# Patient Record
Sex: MALE | Race: OTHER | HISPANIC OR LATINO | URBAN - METROPOLITAN AREA
[De-identification: names, ages, dates, MRNs, and addresses within clinical notes are randomized per-mention and may not be internally consistent; named-entity substitution may affect disease eponyms.]

---

## 2016-01-27 RX ORDER — ATORVASTATIN CALCIUM 80 MG/1
1 TABLET, FILM COATED ORAL
Qty: 0 | Refills: 0 | COMMUNITY
Start: 2016-01-27

## 2016-01-27 RX ORDER — METOPROLOL TARTRATE 50 MG
1 TABLET ORAL
Qty: 0 | Refills: 0 | COMMUNITY
Start: 2016-01-27

## 2016-01-27 RX ORDER — LISINOPRIL 2.5 MG/1
1 TABLET ORAL
Qty: 0 | Refills: 0 | COMMUNITY
Start: 2016-01-27

## 2017-01-04 ENCOUNTER — OUTPATIENT (OUTPATIENT)
Dept: OUTPATIENT SERVICES | Facility: HOSPITAL | Age: 57
LOS: 1 days | End: 2017-01-04
Payer: SELF-PAY

## 2017-01-04 ENCOUNTER — APPOINTMENT (OUTPATIENT)
Dept: CARDIOLOGY | Facility: CLINIC | Age: 57
End: 2017-01-04

## 2017-01-04 VITALS
WEIGHT: 200 LBS | RESPIRATION RATE: 16 BRPM | HEART RATE: 67 BPM | DIASTOLIC BLOOD PRESSURE: 76 MMHG | HEIGHT: 71 IN | BODY MASS INDEX: 28 KG/M2 | SYSTOLIC BLOOD PRESSURE: 115 MMHG

## 2017-01-04 VITALS — RESPIRATION RATE: 16 BRPM | SYSTOLIC BLOOD PRESSURE: 111 MMHG | HEART RATE: 70 BPM | DIASTOLIC BLOOD PRESSURE: 72 MMHG

## 2017-01-04 DIAGNOSIS — I25.10 ATHEROSCLEROTIC HEART DISEASE OF NATIVE CORONARY ARTERY WITHOUT ANGINA PECTORIS: ICD-10-CM

## 2017-01-04 PROCEDURE — G0463: CPT

## 2017-03-28 ENCOUNTER — OUTPATIENT (OUTPATIENT)
Dept: OUTPATIENT SERVICES | Facility: HOSPITAL | Age: 57
LOS: 1 days | End: 2017-03-28
Payer: SELF-PAY

## 2017-03-28 DIAGNOSIS — I25.10 ATHEROSCLEROTIC HEART DISEASE OF NATIVE CORONARY ARTERY WITHOUT ANGINA PECTORIS: ICD-10-CM

## 2017-03-28 LAB
ALBUMIN SERPL ELPH-MCNC: 4.3 G/DL — SIGNIFICANT CHANGE UP (ref 3.3–5.2)
ALP SERPL-CCNC: 93 U/L — SIGNIFICANT CHANGE UP (ref 40–120)
ALT FLD-CCNC: 14 U/L — SIGNIFICANT CHANGE UP
ANION GAP SERPL CALC-SCNC: 11 MMOL/L — SIGNIFICANT CHANGE UP (ref 5–17)
AST SERPL-CCNC: 17 U/L — SIGNIFICANT CHANGE UP
BASOPHILS # BLD AUTO: 0 K/UL — SIGNIFICANT CHANGE UP (ref 0–0.2)
BASOPHILS NFR BLD AUTO: 0.3 % — SIGNIFICANT CHANGE UP (ref 0–2)
BILIRUB SERPL-MCNC: 0.6 MG/DL — SIGNIFICANT CHANGE UP (ref 0.4–2)
BUN SERPL-MCNC: 8 MG/DL — SIGNIFICANT CHANGE UP (ref 8–20)
CALCIUM SERPL-MCNC: 9.3 MG/DL — SIGNIFICANT CHANGE UP (ref 8.6–10.2)
CHLORIDE SERPL-SCNC: 103 MMOL/L — SIGNIFICANT CHANGE UP (ref 98–107)
CHOLEST SERPL-MCNC: 129 MG/DL — SIGNIFICANT CHANGE UP (ref 110–199)
CO2 SERPL-SCNC: 28 MMOL/L — SIGNIFICANT CHANGE UP (ref 22–29)
CREAT SERPL-MCNC: 0.87 MG/DL — SIGNIFICANT CHANGE UP (ref 0.5–1.3)
EOSINOPHIL # BLD AUTO: 0.1 K/UL — SIGNIFICANT CHANGE UP (ref 0–0.5)
EOSINOPHIL NFR BLD AUTO: 1 % — SIGNIFICANT CHANGE UP (ref 0–5)
GLUCOSE SERPL-MCNC: 90 MG/DL — SIGNIFICANT CHANGE UP (ref 70–115)
HBA1C BLD-MCNC: 5.1 % — SIGNIFICANT CHANGE UP (ref 4–5.6)
HCT VFR BLD CALC: 41.2 % — LOW (ref 42–52)
HDLC SERPL-MCNC: 57 MG/DL — SIGNIFICANT CHANGE UP
HGB BLD-MCNC: 14.4 G/DL — SIGNIFICANT CHANGE UP (ref 14–18)
LIPID PNL WITH DIRECT LDL SERPL: 55 MG/DL — SIGNIFICANT CHANGE UP
LYMPHOCYTES # BLD AUTO: 2.4 K/UL — SIGNIFICANT CHANGE UP (ref 1–4.8)
LYMPHOCYTES # BLD AUTO: 34.2 % — SIGNIFICANT CHANGE UP (ref 20–55)
MCHC RBC-ENTMCNC: 31 PG — SIGNIFICANT CHANGE UP (ref 27–31)
MCHC RBC-ENTMCNC: 35 G/DL — SIGNIFICANT CHANGE UP (ref 32–36)
MCV RBC AUTO: 88.8 FL — SIGNIFICANT CHANGE UP (ref 80–94)
MONOCYTES # BLD AUTO: 0.5 K/UL — SIGNIFICANT CHANGE UP (ref 0–0.8)
MONOCYTES NFR BLD AUTO: 7.6 % — SIGNIFICANT CHANGE UP (ref 3–10)
NEUTROPHILS # BLD AUTO: 3.9 K/UL — SIGNIFICANT CHANGE UP (ref 1.8–8)
NEUTROPHILS NFR BLD AUTO: 56.8 % — SIGNIFICANT CHANGE UP (ref 37–73)
PLATELET # BLD AUTO: 237 K/UL — SIGNIFICANT CHANGE UP (ref 150–400)
POTASSIUM SERPL-MCNC: 4.4 MMOL/L — SIGNIFICANT CHANGE UP (ref 3.5–5.3)
POTASSIUM SERPL-SCNC: 4.4 MMOL/L — SIGNIFICANT CHANGE UP (ref 3.5–5.3)
PROT SERPL-MCNC: 7.3 G/DL — SIGNIFICANT CHANGE UP (ref 6.6–8.7)
RBC # BLD: 4.64 M/UL — SIGNIFICANT CHANGE UP (ref 4.6–6.2)
RBC # FLD: 13.5 % — SIGNIFICANT CHANGE UP (ref 11–15.6)
SODIUM SERPL-SCNC: 142 MMOL/L — SIGNIFICANT CHANGE UP (ref 135–145)
TOTAL CHOLESTEROL/HDL RATIO MEASUREMENT: 2 RATIO — LOW (ref 3.4–9.6)
TRIGL SERPL-MCNC: 84 MG/DL — SIGNIFICANT CHANGE UP (ref 10–200)
TSH SERPL-MCNC: 1.59 UIU/ML — SIGNIFICANT CHANGE UP (ref 0.27–4.2)
WBC # BLD: 6.9 K/UL — SIGNIFICANT CHANGE UP (ref 4.8–10.8)
WBC # FLD AUTO: 6.9 K/UL — SIGNIFICANT CHANGE UP (ref 4.8–10.8)

## 2017-03-28 PROCEDURE — 85027 COMPLETE CBC AUTOMATED: CPT

## 2017-03-28 PROCEDURE — 36415 COLL VENOUS BLD VENIPUNCTURE: CPT

## 2017-03-28 PROCEDURE — 80053 COMPREHEN METABOLIC PANEL: CPT

## 2017-03-28 PROCEDURE — G0463: CPT

## 2017-03-28 PROCEDURE — 80061 LIPID PANEL: CPT

## 2017-03-28 PROCEDURE — 83036 HEMOGLOBIN GLYCOSYLATED A1C: CPT

## 2017-03-28 PROCEDURE — 84443 ASSAY THYROID STIM HORMONE: CPT

## 2017-04-12 ENCOUNTER — OUTPATIENT (OUTPATIENT)
Dept: OUTPATIENT SERVICES | Facility: HOSPITAL | Age: 57
LOS: 1 days | End: 2017-04-12
Payer: SELF-PAY

## 2017-04-12 ENCOUNTER — APPOINTMENT (OUTPATIENT)
Dept: CARDIOLOGY | Facility: CLINIC | Age: 57
End: 2017-04-12

## 2017-04-12 VITALS
RESPIRATION RATE: 16 BRPM | DIASTOLIC BLOOD PRESSURE: 65 MMHG | WEIGHT: 200 LBS | BODY MASS INDEX: 28 KG/M2 | SYSTOLIC BLOOD PRESSURE: 110 MMHG | HEIGHT: 71 IN

## 2017-04-12 DIAGNOSIS — I25.10 ATHEROSCLEROTIC HEART DISEASE OF NATIVE CORONARY ARTERY WITHOUT ANGINA PECTORIS: ICD-10-CM

## 2017-04-12 PROCEDURE — G0463: CPT

## 2017-07-12 ENCOUNTER — APPOINTMENT (OUTPATIENT)
Dept: CARDIOLOGY | Facility: CLINIC | Age: 57
End: 2017-07-12

## 2017-07-12 ENCOUNTER — OUTPATIENT (OUTPATIENT)
Dept: OUTPATIENT SERVICES | Facility: HOSPITAL | Age: 57
LOS: 1 days | End: 2017-07-12
Payer: SELF-PAY

## 2017-07-12 DIAGNOSIS — I25.10 ATHEROSCLEROTIC HEART DISEASE OF NATIVE CORONARY ARTERY WITHOUT ANGINA PECTORIS: ICD-10-CM

## 2017-07-12 PROCEDURE — G0463: CPT

## 2017-10-04 ENCOUNTER — OUTPATIENT (OUTPATIENT)
Dept: OUTPATIENT SERVICES | Facility: HOSPITAL | Age: 57
LOS: 1 days | End: 2017-10-04
Payer: SELF-PAY

## 2017-10-04 DIAGNOSIS — Z00.8 ENCOUNTER FOR OTHER GENERAL EXAMINATION: ICD-10-CM

## 2017-10-04 LAB
ALBUMIN SERPL ELPH-MCNC: 4.5 G/DL — SIGNIFICANT CHANGE UP (ref 3.3–5.2)
ALP SERPL-CCNC: 95 U/L — SIGNIFICANT CHANGE UP (ref 40–120)
ALT FLD-CCNC: 12 U/L — SIGNIFICANT CHANGE UP
ANION GAP SERPL CALC-SCNC: 10 MMOL/L — SIGNIFICANT CHANGE UP (ref 5–17)
AST SERPL-CCNC: 19 U/L — SIGNIFICANT CHANGE UP
BASOPHILS # BLD AUTO: 0 K/UL — SIGNIFICANT CHANGE UP (ref 0–0.2)
BASOPHILS NFR BLD AUTO: 0.2 % — SIGNIFICANT CHANGE UP (ref 0–2)
BILIRUB DIRECT SERPL-MCNC: 0.1 MG/DL — SIGNIFICANT CHANGE UP (ref 0–0.3)
BILIRUB INDIRECT FLD-MCNC: 0.6 MG/DL — SIGNIFICANT CHANGE UP (ref 0.2–1)
BILIRUB SERPL-MCNC: 0.7 MG/DL — SIGNIFICANT CHANGE UP (ref 0.4–2)
BUN SERPL-MCNC: 10 MG/DL — SIGNIFICANT CHANGE UP (ref 8–20)
CALCIUM SERPL-MCNC: 10 MG/DL — SIGNIFICANT CHANGE UP (ref 8.6–10.2)
CHLORIDE SERPL-SCNC: 103 MMOL/L — SIGNIFICANT CHANGE UP (ref 98–107)
CHOLEST SERPL-MCNC: 143 MG/DL — SIGNIFICANT CHANGE UP (ref 110–199)
CO2 SERPL-SCNC: 28 MMOL/L — SIGNIFICANT CHANGE UP (ref 22–29)
CREAT SERPL-MCNC: 0.9 MG/DL — SIGNIFICANT CHANGE UP (ref 0.5–1.3)
EOSINOPHIL # BLD AUTO: 0.1 K/UL — SIGNIFICANT CHANGE UP (ref 0–0.5)
EOSINOPHIL NFR BLD AUTO: 0.6 % — SIGNIFICANT CHANGE UP (ref 0–5)
GLUCOSE SERPL-MCNC: 105 MG/DL — SIGNIFICANT CHANGE UP (ref 70–115)
HBA1C BLD-MCNC: 4.8 % — SIGNIFICANT CHANGE UP (ref 4–5.6)
HCT VFR BLD CALC: 43.9 % — SIGNIFICANT CHANGE UP (ref 42–52)
HDLC SERPL-MCNC: 63 MG/DL — SIGNIFICANT CHANGE UP
HGB BLD-MCNC: 15.4 G/DL — SIGNIFICANT CHANGE UP (ref 14–18)
LIPID PNL WITH DIRECT LDL SERPL: 61 MG/DL — SIGNIFICANT CHANGE UP
LYMPHOCYTES # BLD AUTO: 2.8 K/UL — SIGNIFICANT CHANGE UP (ref 1–4.8)
LYMPHOCYTES # BLD AUTO: 27.7 % — SIGNIFICANT CHANGE UP (ref 20–55)
MCHC RBC-ENTMCNC: 31.2 PG — HIGH (ref 27–31)
MCHC RBC-ENTMCNC: 35.1 G/DL — SIGNIFICANT CHANGE UP (ref 32–36)
MCV RBC AUTO: 89 FL — SIGNIFICANT CHANGE UP (ref 80–94)
MONOCYTES # BLD AUTO: 0.8 K/UL — SIGNIFICANT CHANGE UP (ref 0–0.8)
MONOCYTES NFR BLD AUTO: 7.9 % — SIGNIFICANT CHANGE UP (ref 3–10)
NEUTROPHILS # BLD AUTO: 6.3 K/UL — SIGNIFICANT CHANGE UP (ref 1.8–8)
NEUTROPHILS NFR BLD AUTO: 63.4 % — SIGNIFICANT CHANGE UP (ref 37–73)
PLATELET # BLD AUTO: 231 K/UL — SIGNIFICANT CHANGE UP (ref 150–400)
POTASSIUM SERPL-MCNC: 4.8 MMOL/L — SIGNIFICANT CHANGE UP (ref 3.5–5.3)
POTASSIUM SERPL-SCNC: 4.8 MMOL/L — SIGNIFICANT CHANGE UP (ref 3.5–5.3)
PROT SERPL-MCNC: 7.7 G/DL — SIGNIFICANT CHANGE UP (ref 6.6–8.7)
RBC # BLD: 4.93 M/UL — SIGNIFICANT CHANGE UP (ref 4.6–6.2)
RBC # FLD: 13.5 % — SIGNIFICANT CHANGE UP (ref 11–15.6)
SODIUM SERPL-SCNC: 141 MMOL/L — SIGNIFICANT CHANGE UP (ref 135–145)
TOTAL CHOLESTEROL/HDL RATIO MEASUREMENT: 2 RATIO — LOW (ref 3.4–9.6)
TRIGL SERPL-MCNC: 97 MG/DL — SIGNIFICANT CHANGE UP (ref 10–200)
TSH SERPL-MCNC: 1.12 UIU/ML — SIGNIFICANT CHANGE UP (ref 0.27–4.2)
WBC # BLD: 9.9 K/UL — SIGNIFICANT CHANGE UP (ref 4.8–10.8)
WBC # FLD AUTO: 9.9 K/UL — SIGNIFICANT CHANGE UP (ref 4.8–10.8)

## 2017-10-04 PROCEDURE — 84443 ASSAY THYROID STIM HORMONE: CPT

## 2017-10-04 PROCEDURE — 36415 COLL VENOUS BLD VENIPUNCTURE: CPT

## 2017-10-04 PROCEDURE — 80061 LIPID PANEL: CPT

## 2017-10-04 PROCEDURE — 80053 COMPREHEN METABOLIC PANEL: CPT

## 2017-10-04 PROCEDURE — G0463: CPT

## 2017-10-04 PROCEDURE — 82248 BILIRUBIN DIRECT: CPT

## 2017-10-04 PROCEDURE — 83036 HEMOGLOBIN GLYCOSYLATED A1C: CPT

## 2017-10-04 PROCEDURE — 85027 COMPLETE CBC AUTOMATED: CPT

## 2017-10-25 ENCOUNTER — OUTPATIENT (OUTPATIENT)
Dept: OUTPATIENT SERVICES | Facility: HOSPITAL | Age: 57
LOS: 1 days | End: 2017-10-25
Payer: SELF-PAY

## 2017-10-25 ENCOUNTER — APPOINTMENT (OUTPATIENT)
Dept: CARDIOLOGY | Facility: CLINIC | Age: 57
End: 2017-10-25

## 2017-10-25 VITALS — DIASTOLIC BLOOD PRESSURE: 65 MMHG | SYSTOLIC BLOOD PRESSURE: 115 MMHG | RESPIRATION RATE: 16 BRPM | HEART RATE: 69 BPM

## 2017-10-25 VITALS — RESPIRATION RATE: 16 BRPM | SYSTOLIC BLOOD PRESSURE: 129 MMHG | HEART RATE: 74 BPM | DIASTOLIC BLOOD PRESSURE: 91 MMHG

## 2017-10-25 DIAGNOSIS — I25.10 ATHEROSCLEROTIC HEART DISEASE OF NATIVE CORONARY ARTERY WITHOUT ANGINA PECTORIS: ICD-10-CM

## 2017-10-25 PROCEDURE — G0463: CPT

## 2017-11-17 ENCOUNTER — OUTPATIENT (OUTPATIENT)
Dept: OUTPATIENT SERVICES | Facility: HOSPITAL | Age: 57
LOS: 1 days | End: 2017-11-17
Payer: SELF-PAY

## 2017-11-17 DIAGNOSIS — R07.89 OTHER CHEST PAIN: ICD-10-CM

## 2017-11-17 PROCEDURE — 93017 CV STRESS TEST TRACING ONLY: CPT

## 2017-11-17 PROCEDURE — 78452 HT MUSCLE IMAGE SPECT MULT: CPT

## 2017-11-17 PROCEDURE — T1013: CPT

## 2017-11-29 ENCOUNTER — APPOINTMENT (OUTPATIENT)
Dept: CARDIOLOGY | Facility: CLINIC | Age: 57
End: 2017-11-29

## 2018-01-20 ENCOUNTER — RX RENEWAL (OUTPATIENT)
Age: 58
End: 2018-01-20

## 2018-01-24 ENCOUNTER — APPOINTMENT (OUTPATIENT)
Dept: CARDIOLOGY | Facility: CLINIC | Age: 58
End: 2018-01-24

## 2018-01-24 ENCOUNTER — OUTPATIENT (OUTPATIENT)
Dept: OUTPATIENT SERVICES | Facility: HOSPITAL | Age: 58
LOS: 1 days | End: 2018-01-24
Payer: SELF-PAY

## 2018-01-24 VITALS — SYSTOLIC BLOOD PRESSURE: 118 MMHG | HEART RATE: 80 BPM | DIASTOLIC BLOOD PRESSURE: 85 MMHG | RESPIRATION RATE: 16 BRPM

## 2018-01-24 VITALS — HEART RATE: 81 BPM | SYSTOLIC BLOOD PRESSURE: 121 MMHG | DIASTOLIC BLOOD PRESSURE: 87 MMHG

## 2018-01-24 DIAGNOSIS — I25.10 ATHEROSCLEROTIC HEART DISEASE OF NATIVE CORONARY ARTERY WITHOUT ANGINA PECTORIS: ICD-10-CM

## 2018-01-24 PROCEDURE — G0463: CPT

## 2018-06-20 ENCOUNTER — APPOINTMENT (OUTPATIENT)
Dept: CARDIOLOGY | Facility: CLINIC | Age: 58
End: 2018-06-20

## 2018-06-27 ENCOUNTER — APPOINTMENT (OUTPATIENT)
Dept: CARDIOLOGY | Facility: CLINIC | Age: 58
End: 2018-06-27

## 2018-07-25 ENCOUNTER — APPOINTMENT (OUTPATIENT)
Dept: CARDIOLOGY | Facility: CLINIC | Age: 58
End: 2018-07-25

## 2018-10-10 ENCOUNTER — OUTPATIENT (OUTPATIENT)
Dept: OUTPATIENT SERVICES | Facility: HOSPITAL | Age: 58
LOS: 1 days | End: 2018-10-10
Payer: SELF-PAY

## 2018-10-10 ENCOUNTER — APPOINTMENT (OUTPATIENT)
Dept: CARDIOLOGY | Facility: CLINIC | Age: 58
End: 2018-10-10

## 2018-10-10 VITALS — DIASTOLIC BLOOD PRESSURE: 64 MMHG | RESPIRATION RATE: 14 BRPM | SYSTOLIC BLOOD PRESSURE: 110 MMHG

## 2018-10-10 VITALS
DIASTOLIC BLOOD PRESSURE: 66 MMHG | WEIGHT: 189 LBS | BODY MASS INDEX: 26.46 KG/M2 | HEART RATE: 66 BPM | HEIGHT: 71 IN | SYSTOLIC BLOOD PRESSURE: 108 MMHG | RESPIRATION RATE: 14 BRPM

## 2018-10-10 DIAGNOSIS — I25.10 ATHEROSCLEROTIC HEART DISEASE OF NATIVE CORONARY ARTERY WITHOUT ANGINA PECTORIS: ICD-10-CM

## 2018-10-10 PROCEDURE — G0463: CPT

## 2018-12-10 ENCOUNTER — OUTPATIENT (OUTPATIENT)
Dept: OUTPATIENT SERVICES | Facility: HOSPITAL | Age: 58
LOS: 1 days | End: 2018-12-10
Payer: SELF-PAY

## 2018-12-10 DIAGNOSIS — I25.10 ATHEROSCLEROTIC HEART DISEASE OF NATIVE CORONARY ARTERY WITHOUT ANGINA PECTORIS: ICD-10-CM

## 2018-12-10 LAB
ALBUMIN SERPL ELPH-MCNC: 4.3 G/DL — SIGNIFICANT CHANGE UP (ref 3.3–5.2)
ALP SERPL-CCNC: 79 U/L — SIGNIFICANT CHANGE UP (ref 40–120)
ALT FLD-CCNC: 10 U/L — SIGNIFICANT CHANGE UP
ANION GAP SERPL CALC-SCNC: 10 MMOL/L — SIGNIFICANT CHANGE UP (ref 5–17)
AST SERPL-CCNC: 17 U/L — SIGNIFICANT CHANGE UP
BILIRUB DIRECT SERPL-MCNC: 0.1 MG/DL — SIGNIFICANT CHANGE UP (ref 0–0.3)
BILIRUB INDIRECT FLD-MCNC: 0.4 MG/DL — SIGNIFICANT CHANGE UP (ref 0.2–1)
BILIRUB SERPL-MCNC: 0.5 MG/DL — SIGNIFICANT CHANGE UP (ref 0.4–2)
BUN SERPL-MCNC: 12 MG/DL — SIGNIFICANT CHANGE UP (ref 8–20)
CALCIUM SERPL-MCNC: 9.9 MG/DL — SIGNIFICANT CHANGE UP (ref 8.6–10.2)
CHLORIDE SERPL-SCNC: 103 MMOL/L — SIGNIFICANT CHANGE UP (ref 98–107)
CHOLEST SERPL-MCNC: 192 MG/DL — SIGNIFICANT CHANGE UP (ref 110–199)
CO2 SERPL-SCNC: 27 MMOL/L — SIGNIFICANT CHANGE UP (ref 22–29)
CREAT SERPL-MCNC: 0.81 MG/DL — SIGNIFICANT CHANGE UP (ref 0.5–1.3)
GLUCOSE SERPL-MCNC: 91 MG/DL — SIGNIFICANT CHANGE UP (ref 70–115)
HBA1C BLD-MCNC: 5.1 % — SIGNIFICANT CHANGE UP (ref 4–5.6)
HCT VFR BLD CALC: 43.1 % — SIGNIFICANT CHANGE UP (ref 42–52)
HDLC SERPL-MCNC: 64 MG/DL — SIGNIFICANT CHANGE UP
HGB BLD-MCNC: 14.8 G/DL — SIGNIFICANT CHANGE UP (ref 14–18)
LIPID PNL WITH DIRECT LDL SERPL: 97 MG/DL — SIGNIFICANT CHANGE UP
MAGNESIUM SERPL-MCNC: 2 MG/DL — SIGNIFICANT CHANGE UP (ref 1.6–2.6)
MCHC RBC-ENTMCNC: 31.1 PG — HIGH (ref 27–31)
MCHC RBC-ENTMCNC: 34.3 G/DL — SIGNIFICANT CHANGE UP (ref 32–36)
MCV RBC AUTO: 90.5 FL — SIGNIFICANT CHANGE UP (ref 80–94)
PLATELET # BLD AUTO: 222 K/UL — SIGNIFICANT CHANGE UP (ref 150–400)
POTASSIUM SERPL-MCNC: 4.9 MMOL/L — SIGNIFICANT CHANGE UP (ref 3.5–5.3)
POTASSIUM SERPL-SCNC: 4.9 MMOL/L — SIGNIFICANT CHANGE UP (ref 3.5–5.3)
PROT SERPL-MCNC: 7.5 G/DL — SIGNIFICANT CHANGE UP (ref 6.6–8.7)
RBC # BLD: 4.76 M/UL — SIGNIFICANT CHANGE UP (ref 4.6–6.2)
RBC # FLD: 13.2 % — SIGNIFICANT CHANGE UP (ref 11–15.6)
SODIUM SERPL-SCNC: 140 MMOL/L — SIGNIFICANT CHANGE UP (ref 135–145)
TOTAL CHOLESTEROL/HDL RATIO MEASUREMENT: 3 RATIO — LOW (ref 3.4–9.6)
TRIGL SERPL-MCNC: 153 MG/DL — SIGNIFICANT CHANGE UP (ref 10–200)
TSH SERPL-MCNC: 1.81 UIU/ML — SIGNIFICANT CHANGE UP (ref 0.27–4.2)
WBC # BLD: 7.9 K/UL — SIGNIFICANT CHANGE UP (ref 4.8–10.8)
WBC # FLD AUTO: 7.9 K/UL — SIGNIFICANT CHANGE UP (ref 4.8–10.8)

## 2018-12-10 PROCEDURE — 80053 COMPREHEN METABOLIC PANEL: CPT

## 2018-12-10 PROCEDURE — 85027 COMPLETE CBC AUTOMATED: CPT

## 2018-12-10 PROCEDURE — 84153 ASSAY OF PSA TOTAL: CPT

## 2018-12-10 PROCEDURE — 84443 ASSAY THYROID STIM HORMONE: CPT

## 2018-12-10 PROCEDURE — 80061 LIPID PANEL: CPT

## 2018-12-10 PROCEDURE — 83036 HEMOGLOBIN GLYCOSYLATED A1C: CPT

## 2018-12-10 PROCEDURE — 83735 ASSAY OF MAGNESIUM: CPT

## 2018-12-10 PROCEDURE — 82248 BILIRUBIN DIRECT: CPT

## 2018-12-11 LAB — PSA FREE MFR FLD: 0.5 NG/ML — SIGNIFICANT CHANGE UP (ref 0–4)

## 2018-12-18 NOTE — ASSESSMENT
[FreeTextEntry1] : 1 - CAD s/p stenting:  stable at this time.  No CP, SOB, palpitations, lightheadedness or syncope.\par 2 - Hypertension:  Blood pressure well controlled on current medications.\par 3 - Hypercholesterolemia:  presently with bilateral leg cramping

## 2018-12-18 NOTE — DISCUSSION/SUMMARY
[FreeTextEntry1] : 1 - Hold atorvastatin for now\par 2 - Continue all other medications\par 3 - Low fat / low cholesterol / low carb / low salt diet\par 4 - Fasting blood work in 6 weeks\par 5 - F/U in 8 weeks

## 2018-12-18 NOTE — PHYSICAL EXAM
[General Appearance - Well Developed] : well developed [General Appearance - In No Acute Distress] : no acute distress [Normal Conjunctiva] : the conjunctiva exhibited no abnormalities [Normal Oral Mucosa] : normal oral mucosa [] : no respiratory distress [Auscultation Breath Sounds / Voice Sounds] : lungs were clear to auscultation bilaterally [Heart Rate And Rhythm] : heart rate and rhythm were normal [Heart Sounds] : normal S1 and S2 [Murmurs] : no murmurs present [Edema] : no peripheral edema present [Bowel Sounds] : normal bowel sounds [Abnormal Walk] : normal gait [FreeTextEntry1] : Without edema [Skin Color & Pigmentation] : normal skin color and pigmentation [Skin Turgor] : normal skin turgor [Oriented To Time, Place, And Person] : oriented to person, place, and time [Affect] : the affect was normal [Mood] : the mood was normal

## 2018-12-18 NOTE — HISTORY OF PRESENT ILLNESS
[FreeTextEntry1] : Mr. Mccollum is a 58 y.o.  male who presents for follow up evaluation.  States that he is feeling well.  Denies any complaints of exertional chest pain, shortness of breath, palpitations. lightheadedness or syncope.  Has been experiencing bilateral leg cramps.

## 2018-12-19 ENCOUNTER — APPOINTMENT (OUTPATIENT)
Dept: CARDIOLOGY | Facility: CLINIC | Age: 58
End: 2018-12-19

## 2018-12-22 ENCOUNTER — INPATIENT (INPATIENT)
Facility: HOSPITAL | Age: 58
LOS: 2 days | Discharge: HOME | DRG: 378 | End: 2018-12-25
Attending: FAMILY MEDICINE | Admitting: FAMILY MEDICINE
Payer: SELF-PAY

## 2018-12-22 VITALS
RESPIRATION RATE: 20 BRPM | TEMPERATURE: 97 F | HEART RATE: 99 BPM | SYSTOLIC BLOOD PRESSURE: 100 MMHG | OXYGEN SATURATION: 100 % | HEIGHT: 71 IN | DIASTOLIC BLOOD PRESSURE: 64 MMHG | WEIGHT: 184.97 LBS

## 2018-12-22 DIAGNOSIS — Z95.5 PRESENCE OF CORONARY ANGIOPLASTY IMPLANT AND GRAFT: Chronic | ICD-10-CM

## 2018-12-22 DIAGNOSIS — K92.2 GASTROINTESTINAL HEMORRHAGE, UNSPECIFIED: ICD-10-CM

## 2018-12-22 LAB
ALBUMIN SERPL ELPH-MCNC: 3.8 G/DL — SIGNIFICANT CHANGE UP (ref 3.3–5.2)
ALP SERPL-CCNC: 57 U/L — SIGNIFICANT CHANGE UP (ref 40–120)
ALT FLD-CCNC: 7 U/L — SIGNIFICANT CHANGE UP
ANION GAP SERPL CALC-SCNC: 9 MMOL/L — SIGNIFICANT CHANGE UP (ref 5–17)
APTT BLD: 29.4 SEC — SIGNIFICANT CHANGE UP (ref 27.5–36.3)
AST SERPL-CCNC: 14 U/L — SIGNIFICANT CHANGE UP
BASOPHILS # BLD AUTO: 0 K/UL — SIGNIFICANT CHANGE UP (ref 0–0.2)
BASOPHILS NFR BLD AUTO: 0.2 % — SIGNIFICANT CHANGE UP (ref 0–2)
BILIRUB SERPL-MCNC: 0.2 MG/DL — LOW (ref 0.4–2)
BLD GP AB SCN SERPL QL: SIGNIFICANT CHANGE UP
BUN SERPL-MCNC: 37 MG/DL — HIGH (ref 8–20)
CALCIUM SERPL-MCNC: 8.6 MG/DL — SIGNIFICANT CHANGE UP (ref 8.6–10.2)
CHLORIDE SERPL-SCNC: 106 MMOL/L — SIGNIFICANT CHANGE UP (ref 98–107)
CO2 SERPL-SCNC: 23 MMOL/L — SIGNIFICANT CHANGE UP (ref 22–29)
CREAT SERPL-MCNC: 0.79 MG/DL — SIGNIFICANT CHANGE UP (ref 0.5–1.3)
EOSINOPHIL # BLD AUTO: 0 K/UL — SIGNIFICANT CHANGE UP (ref 0–0.5)
EOSINOPHIL NFR BLD AUTO: 0.1 % — SIGNIFICANT CHANGE UP (ref 0–5)
GLUCOSE SERPL-MCNC: 94 MG/DL — SIGNIFICANT CHANGE UP (ref 70–115)
HCT VFR BLD CALC: 26.8 % — LOW (ref 42–52)
HCT VFR BLD CALC: 31.1 % — LOW (ref 42–52)
HGB BLD-MCNC: 10.8 G/DL — LOW (ref 14–18)
HGB BLD-MCNC: 9.3 G/DL — LOW (ref 14–18)
INR BLD: 1.08 RATIO — SIGNIFICANT CHANGE UP (ref 0.88–1.16)
LIDOCAIN IGE QN: 22 U/L — SIGNIFICANT CHANGE UP (ref 22–51)
LYMPHOCYTES # BLD AUTO: 18.9 % — LOW (ref 20–55)
LYMPHOCYTES # BLD AUTO: 2.3 K/UL — SIGNIFICANT CHANGE UP (ref 1–4.8)
MCHC RBC-ENTMCNC: 31.6 PG — HIGH (ref 27–31)
MCHC RBC-ENTMCNC: 34.7 G/DL — SIGNIFICANT CHANGE UP (ref 32–36)
MCV RBC AUTO: 90.9 FL — SIGNIFICANT CHANGE UP (ref 80–94)
MONOCYTES # BLD AUTO: 0.6 K/UL — SIGNIFICANT CHANGE UP (ref 0–0.8)
MONOCYTES NFR BLD AUTO: 4.7 % — SIGNIFICANT CHANGE UP (ref 3–10)
NEUTROPHILS # BLD AUTO: 9.3 K/UL — HIGH (ref 1.8–8)
NEUTROPHILS NFR BLD AUTO: 75.8 % — HIGH (ref 37–73)
OB PNL STL: POSITIVE
PLATELET # BLD AUTO: 238 K/UL — SIGNIFICANT CHANGE UP (ref 150–400)
POTASSIUM SERPL-MCNC: 4.9 MMOL/L — SIGNIFICANT CHANGE UP (ref 3.5–5.3)
POTASSIUM SERPL-SCNC: 4.9 MMOL/L — SIGNIFICANT CHANGE UP (ref 3.5–5.3)
PROT SERPL-MCNC: 6.2 G/DL — LOW (ref 6.6–8.7)
PROTHROM AB SERPL-ACNC: 12.4 SEC — SIGNIFICANT CHANGE UP (ref 10–12.9)
RBC # BLD: 3.42 M/UL — LOW (ref 4.6–6.2)
RBC # FLD: 13.4 % — SIGNIFICANT CHANGE UP (ref 11–15.6)
SODIUM SERPL-SCNC: 138 MMOL/L — SIGNIFICANT CHANGE UP (ref 135–145)
TROPONIN T SERPL-MCNC: <0.01 NG/ML — SIGNIFICANT CHANGE UP (ref 0–0.06)
TYPE + AB SCN PNL BLD: SIGNIFICANT CHANGE UP
WBC # BLD: 12.3 K/UL — HIGH (ref 4.8–10.8)
WBC # FLD AUTO: 12.3 K/UL — HIGH (ref 4.8–10.8)

## 2018-12-22 PROCEDURE — 99285 EMERGENCY DEPT VISIT HI MDM: CPT

## 2018-12-22 PROCEDURE — 99223 1ST HOSP IP/OBS HIGH 75: CPT

## 2018-12-22 PROCEDURE — 93010 ELECTROCARDIOGRAM REPORT: CPT

## 2018-12-22 PROCEDURE — 99254 IP/OBS CNSLTJ NEW/EST MOD 60: CPT

## 2018-12-22 RX ORDER — PANTOPRAZOLE SODIUM 20 MG/1
8 TABLET, DELAYED RELEASE ORAL
Qty: 80 | Refills: 0 | Status: DISCONTINUED | OUTPATIENT
Start: 2018-12-22 | End: 2018-12-24

## 2018-12-22 RX ORDER — METOPROLOL TARTRATE 50 MG
25 TABLET ORAL DAILY
Qty: 0 | Refills: 0 | Status: DISCONTINUED | OUTPATIENT
Start: 2018-12-22 | End: 2018-12-25

## 2018-12-22 RX ORDER — SODIUM CHLORIDE 9 MG/ML
1000 INJECTION INTRAMUSCULAR; INTRAVENOUS; SUBCUTANEOUS ONCE
Qty: 0 | Refills: 0 | Status: COMPLETED | OUTPATIENT
Start: 2018-12-22 | End: 2018-12-22

## 2018-12-22 RX ORDER — INFLUENZA VIRUS VACCINE 15; 15; 15; 15 UG/.5ML; UG/.5ML; UG/.5ML; UG/.5ML
0.5 SUSPENSION INTRAMUSCULAR ONCE
Qty: 0 | Refills: 0 | Status: DISCONTINUED | OUTPATIENT
Start: 2018-12-22 | End: 2018-12-25

## 2018-12-22 RX ORDER — ONDANSETRON 8 MG/1
4 TABLET, FILM COATED ORAL ONCE
Qty: 0 | Refills: 0 | Status: COMPLETED | OUTPATIENT
Start: 2018-12-22 | End: 2018-12-22

## 2018-12-22 RX ORDER — ATORVASTATIN CALCIUM 80 MG/1
40 TABLET, FILM COATED ORAL AT BEDTIME
Qty: 0 | Refills: 0 | Status: DISCONTINUED | OUTPATIENT
Start: 2018-12-22 | End: 2018-12-25

## 2018-12-22 RX ORDER — LISINOPRIL 2.5 MG/1
2.5 TABLET ORAL DAILY
Qty: 0 | Refills: 0 | Status: DISCONTINUED | OUTPATIENT
Start: 2018-12-22 | End: 2018-12-25

## 2018-12-22 RX ORDER — PANTOPRAZOLE SODIUM 20 MG/1
40 TABLET, DELAYED RELEASE ORAL ONCE
Qty: 0 | Refills: 0 | Status: COMPLETED | OUTPATIENT
Start: 2018-12-22 | End: 2018-12-22

## 2018-12-22 RX ORDER — SODIUM CHLORIDE 9 MG/ML
1000 INJECTION, SOLUTION INTRAVENOUS
Qty: 0 | Refills: 0 | Status: DISCONTINUED | OUTPATIENT
Start: 2018-12-22 | End: 2018-12-24

## 2018-12-22 RX ADMIN — ONDANSETRON 4 MILLIGRAM(S): 8 TABLET, FILM COATED ORAL at 14:25

## 2018-12-22 RX ADMIN — PANTOPRAZOLE SODIUM 10 MG/HR: 20 TABLET, DELAYED RELEASE ORAL at 16:41

## 2018-12-22 RX ADMIN — SODIUM CHLORIDE 2000 MILLILITER(S): 9 INJECTION INTRAMUSCULAR; INTRAVENOUS; SUBCUTANEOUS at 16:22

## 2018-12-22 RX ADMIN — ATORVASTATIN CALCIUM 40 MILLIGRAM(S): 80 TABLET, FILM COATED ORAL at 21:40

## 2018-12-22 RX ADMIN — PANTOPRAZOLE SODIUM 40 MILLIGRAM(S): 20 TABLET, DELAYED RELEASE ORAL at 14:57

## 2018-12-22 RX ADMIN — SODIUM CHLORIDE 1000 MILLILITER(S): 9 INJECTION INTRAMUSCULAR; INTRAVENOUS; SUBCUTANEOUS at 19:06

## 2018-12-22 RX ADMIN — SODIUM CHLORIDE 100 MILLILITER(S): 9 INJECTION, SOLUTION INTRAVENOUS at 19:34

## 2018-12-22 NOTE — ED STATDOCS - PHYSICAL EXAMINATION
Const: Awake, alert and oriented. In no acute distress. Well appearing.  HEENT: NC/AT. Moist mucous membranes.  Eyes: No scleral icterus. EOMI.  Neck:. Soft and supple. Full ROM without pain.  Cardiac: Borderline tachycardic rate and rhythm. +S1/S2. No murmurs. Peripheral pulses 2+ and symmetric. No LE edema.  Resp: Speaking in full sentences. No evidence of respiratory distress. No wheezes, rales or rhonchi.  Abd: Soft, tender in RLQ, non-distended. Normal bowel sounds in all 4 quadrants. No guarding or rebound.  Back: Spine midline and non-tender. No CVAT.  Skin: No rashes, abrasions or lacerations.  Lymph: No cervical lymphadenopathy.

## 2018-12-22 NOTE — H&P ADULT - ASSESSMENT
58 years old male with PMH of CAD s/p stents (2 years ago), HTN and HLD comes with hematemesis. As per patient, he woke up this morning and was feeling well. While taking shower, he felt some pain in epigastrium associated with nausea followed by an episode of hematemesis. He was feeling lightheaded afterwards so he came to the hospital. Denies chest pain, palpitations, shortness of breath, headache or visual symptoms.   He has constipation. Stool color is normal. Denies eating anything unusual.    He takes Aspirin 325 mg and Plavix 75 mg daily. Denies taking any OTC pain medications.     1) Upper GI Bleed  - Hold Aspirin and Plavix  - Monitor H&H   - Transfuse if Hb < 8 (consent obtained)  - CT Abd/Pelvis with IV Contrast  - Continue Protonix Infusion  - Clear Liquid Diet  - EGD on Monday  - GI Consult appreciated  2) HTN  - Continue Lisinopril and Metoprolol with holding parameters  3) HLD  - Continue Lipitor 40 mg  DVT Prophylaxis -- IPC

## 2018-12-22 NOTE — ED ADULT NURSE NOTE - OBJECTIVE STATEMENT
patient is alert and verbal. report vomiting blood onsert this nausea, + nausea. hx: acid reflux, gastritis

## 2018-12-22 NOTE — H&P ADULT - NSHPPHYSICALEXAM_GEN_ALL_CORE
Vital Signs   T(C): 36.2 (22 Dec 2018 12:05), Max: 36.2 (22 Dec 2018 12:05)  T(F): 97.2 (22 Dec 2018 12:05), Max: 97.2 (22 Dec 2018 12:05)  HR: 66 (22 Dec 2018 16:43) (66 - 99)  BP: 103/58 (22 Dec 2018 16:43) (100/64 - 103/58)  RR: 16 (22 Dec 2018 16:43) (16 - 20)  SpO2: 100% (22 Dec 2018 16:43) (100% - 100%)  General: Well developed. Well nourished. No acute distress  HEENT: PERRLA. EOMI. Clear conjunctivae. Moist mucus membrane  Neck: Supple. No JVD. No Thyromegaly   Chest: CTA bilaterally - no wheezing, rales or rhonchi. No chest wall tenderness.  Heart: Normal S1 & S2 with RRR. No murmur.   Abdomen: Soft. Non-tender. Non-distended. + BS  Ext: No pedal edema. No calf tenderness   Neuro: AAO x 3. No focal deficit. CN II-XII grossly WNL.  No speech disorder.  Skin: Warm and Dry  Psychiatry: Normal mood and affect Vital Signs   T(C): 36.2 (22 Dec 2018 12:05), Max: 36.2 (22 Dec 2018 12:05)  T(F): 97.2 (22 Dec 2018 12:05), Max: 97.2 (22 Dec 2018 12:05)  HR: 66 (22 Dec 2018 16:43) (66 - 99)  BP: 103/58 (22 Dec 2018 16:43) (100/64 - 103/58)  RR: 16 (22 Dec 2018 16:43) (16 - 20)  SpO2: 100% (22 Dec 2018 16:43) (100% - 100%)  General: Well developed. Well nourished. No acute distress  HEENT: PERRLA. EOMI. Clear conjunctivae. Moist mucus membrane  Neck: Supple. No JVD. No Thyromegaly   Chest: CTA bilaterally - no wheezing, rales or rhonchi. No chest wall tenderness.  Heart: Normal S1 & S2 with RRR. No murmur.   Abdomen: Soft. Non-tender. Non-distended. + BS. ? Small lump in epigastrium.   Ext: No pedal edema. No calf tenderness   Neuro: AAO x 3. No focal deficit. CN II-XII grossly WNL.  No speech disorder.  Skin: Warm and Dry  Psychiatry: Normal mood and affect Vital Signs   T(C): 36.2 (22 Dec 2018 12:05), Max: 36.2 (22 Dec 2018 12:05)  T(F): 97.2 (22 Dec 2018 12:05), Max: 97.2 (22 Dec 2018 12:05)  HR: 66 (22 Dec 2018 16:43) (66 - 99)  BP: 103/58 (22 Dec 2018 16:43) (100/64 - 103/58)  RR: 16 (22 Dec 2018 16:43) (16 - 20)  SpO2: 100% (22 Dec 2018 16:43) (100% - 100%)  General: Well developed. Well nourished. No acute distress  HEENT: PERRLA. EOMI. Clear conjunctivae. Moist mucus membrane  Neck: Supple. No JVD. No Thyromegaly   Chest: CTA bilaterally - no wheezing, rales or rhonchi. No chest wall tenderness.  Heart: Normal S1 & S2 with RRR. No murmur.   Abdomen: Soft. Non-tender. Non-distended. + BS. ? Small lump in epigastrium.   Rectum: Guaiac positive   Ext: No pedal edema. No calf tenderness   Neuro: AAO x 3. No focal deficit. CN II-XII grossly WNL.  No speech disorder.  Skin: Warm and Dry  Psychiatry: Normal mood and affect

## 2018-12-22 NOTE — CONSULT NOTE ADULT - ASSESSMENT
Hematemesis and prerenal azotemia with moderate drop in Hgb.  On chronic Aspirin/ plavix for distant stents.    suspect silent PUD or AVM c bleeding.   Plan:  Hold Aspirin/ plavix; Monitor hgb.  Eventual EGD for diagnosis; IVF;  IV PPI.  P/R/B/P explained and understood.  EGD for monday, unless considerable bleeding overnight.

## 2018-12-22 NOTE — H&P ADULT - FAMILY HISTORY
Father  Still living? Unknown  Family history of emphysema, Age at diagnosis: Age Unknown     Mother  Still living? Unknown  Family history of colon cancer, Age at diagnosis: Age Unknown

## 2018-12-22 NOTE — CONSULT NOTE ADULT - SUBJECTIVE AND OBJECTIVE BOX
HPI:  58 years old male with PMH of CAD s/p stents (2.7 years ago), HTN and HLD comes with hematemesis. As per patient, he woke up this morning and was feeling well. While taking shower, he felt some pain in epigastrium, heartburn, and nausea followed by an episode of hematemesis. He was feeling lightheaded, dizzy afterwards so he came to the hospital. Denies chest pain, palpitations, shortness of breath, headache or visual symptoms. No melena.  Last BM was brown this morning.   He has constipation. Stool color is normal. Denies eating anything unusual.  No intake of NSAIDs.    He takes Aspirin 325 mg and Plavix 75 mg daily. Denies taking any OTC pain medications.   No prior EGD or colonoscopy.      PAST MEDICAL & SURGICAL HISTORY:  Coronary artery disease  HTN (hypertension)  HLD (hyperlipidemia)  History of coronary artery stent placement x3 in 3/2016; on ASA/ Plavix ever since.       ROS:  No Heartburn, regurgitation, dysphagia, odynophagia.  No dyspepsia  No abdominal pain.    No Nausea, vomiting.  No Bleeding.  No hematemesis.   No diarrhea.    No hematochezia.  No weight loss, anorexia.  No edema.      MEDICATIONS  (STANDING):  atorvastatin 40 milliGRAM(s) Oral at bedtime  lactated ringers. 1000 milliLiter(s) (100 mL/Hr) IV Continuous <Continuous>  lisinopril 2.5 milliGRAM(s) Oral daily  metoprolol succinate ER 25 milliGRAM(s) Oral daily  pantoprazole Infusion 8 mG/Hr (10 mL/Hr) IV Continuous <Continuous>    Allergies  No Known Allergies    SOCIAL HISTORY:Distant smoker.  Dced 2 yrs ago.  No ETOH.      FAMILY HISTORY:  Family history of colon cancer (Mother)  Family history of emphysema (Father)      Vital Signs Last 24 Hrs  T(C): 37.1 (22 Dec 2018 19:17), Max: 37.1 (22 Dec 2018 19:17)  T(F): 98.7 (22 Dec 2018 19:17), Max: 98.7 (22 Dec 2018 19:17)  HR: 69 (22 Dec 2018 19:17) (66 - 99)  BP: 106/57 (22 Dec 2018 19:17) (100/64 - 106/57)  BP(mean): --  RR: 20 (22 Dec 2018 19:17) (16 - 20)  SpO2: 100% (22 Dec 2018 19:17) (100% - 100%)    PHYSICAL EXAM:    GENERAL: NAD, well-groomed, well-developed  HEAD:  Atraumatic, Normocephalic  EYES: EOMI, PERRLA, conjunctiva and sclera clear  ENMT: No tonsillar erythema, exudates, or enlargement; Moist mucous membranes, Good dentition, No lesions  NECK: Supple, No JVD, Normal thyroid  CHEST/LUNG: Clear to percussion bilaterally; No rales, rhonchi, wheezing, or rubs  HEART: Regular rate and rhythm; No murmurs, rubs, or gallops  ABDOMEN: Soft, Nontender, Nondistended; Bowel sounds present; no scars or hernias.    NURA:  No lesions; normal prostate.  Brown stool; (previously + FOBT).   EXTREMITIES:  2+ Peripheral Pulses, No clubbing, cyanosis, or edema  LYMPH: No lymphadenopathy noted  SKIN: No rashes or lesions      LABS:                        9.3    x     )-----------( x        ( 22 Dec 2018 19:21 )             26.8     12-22    138  |  106  |  37.0<H>  ----------------------------<  94  4.9   |  23.0  |  0.79    Ca    8.6      22 Dec 2018 14:42    TPro  6.2<L>  /  Alb  3.8  /  TBili  0.2<L>  /  DBili  x   /  AST  14  /  ALT  7   /  AlkPhos  57  12-22    PT/INR - ( 22 Dec 2018 14:42 )   PT: 12.4 sec;   INR: 1.08 ratio         PTT - ( 22 Dec 2018 14:42 )  PTT:29.4 sec       LIVER FUNCTIONS - ( 22 Dec 2018 14:42 )  Alb: 3.8 g/dL / Pro: 6.2 g/dL / ALK PHOS: 57 U/L / ALT: 7 U/L / AST: 14 U/L / GGT: x           EKG:  NSR; normal.      RADIOLOGY & ADDITIONAL STUDIES:  CT A.P Pending.

## 2018-12-22 NOTE — ED PROVIDER NOTE - MEDICAL DECISION MAKING DETAILS
hemglobin has declined since last outpt labs 2 weeks ago; guiac positive, c/w upper gi bleed; no active bleeding, benign abd exam; d/w hospitalist and with Gi

## 2018-12-22 NOTE — H&P ADULT - HISTORY OF PRESENT ILLNESS
Incomplete 58 years old male with PMH of CAD s/p stents (2 years ago), HTN and HLD comes with hematemesis. As per patient, he woke up this morning and was feeling well. While taking shower, he felt some pain in epigastrium associated with nausea followed by an episode of hematemesis. He was feeling lightheaded afterwards so he came to the hospital. Denies chest pain, palpitations, shortness of breath, headache or visual symptoms.   He has constipation. Stool color is normal. Denies eating anything unusual.    He takes Aspirin 325 mg and Plavix 75 mg daily. Denies taking any OTC pain medications.

## 2018-12-22 NOTE — H&P ADULT - NSHPLABSRESULTS_GEN_ALL_CORE
LABS:                        10.8   12.3  )-----------( 238      ( 22 Dec 2018 14:42 )             31.1     12-22    138  |  106  |  37.0<H>  ----------------------------<  94  4.9   |  23.0  |  0.79    Ca    8.6      22 Dec 2018 14:42    TPro  6.2<L>  /  Alb  3.8  /  TBili  0.2<L>  /  DBili  x   /  AST  14  /  ALT  7   /  AlkPhos  57  12-22    PT/INR - ( 22 Dec 2018 14:42 )   PT: 12.4 sec;   INR: 1.08 ratio         PTT - ( 22 Dec 2018 14:42 )  PTT:29.4 sec  CARDIAC MARKERS ( 22 Dec 2018 14:42 )  x     / <0.01 ng/mL / x     / x     / x

## 2018-12-22 NOTE — ED STATDOCS - PROGRESS NOTE DETAILS
59 y/o M pt with hx of gastritis presents to ED c/o 1 episode of vomiting blood since this AM.  He indicates he felt nauseous prior to vomiting. Denies diarrhea or bloody stool. Takes ASA, Plavix and Metroprolol. Last BM was this AM. Upon examination, patient has RLQ tenderness to palpation and is borderline tachycardic. NKDA. Will be transferred to [facility] for workup by another provider.  : Randa  Card: Rik Roblero

## 2018-12-22 NOTE — ED ADULT NURSE REASSESSMENT NOTE - NS ED NURSE REASSESS COMMENT FT1
Report received from offgoing RN, charting as noted. Patient is A&Ox4, denies any pain or discomfort. Report given to 2G nurse Puneet.  Normal sinus on cardiac monitor.  Awaiting transport.

## 2018-12-22 NOTE — ED PROVIDER NOTE - OBJECTIVE STATEMENT
57 yo male hx of CAD s/p stents 2 years ago, currently on asa and plavix, presents with episode of bloody vomitus this morning; states he awoke this morning feeling well, then while in shower suddenly felt nauseated and vomiting one handful of blood clots; only one episode while at home; no further episodes; +endorses feeling slightly lightheaded while walking around the ED; denies changes to stool color

## 2018-12-23 ENCOUNTER — TRANSCRIPTION ENCOUNTER (OUTPATIENT)
Age: 58
End: 2018-12-23

## 2018-12-23 LAB
ABO RH CONFIRMATION: SIGNIFICANT CHANGE UP
ANION GAP SERPL CALC-SCNC: 10 MMOL/L — SIGNIFICANT CHANGE UP (ref 5–17)
BASOPHILS # BLD AUTO: 0 K/UL — SIGNIFICANT CHANGE UP (ref 0–0.2)
BASOPHILS NFR BLD AUTO: 0.3 % — SIGNIFICANT CHANGE UP (ref 0–2)
BUN SERPL-MCNC: 22 MG/DL — HIGH (ref 8–20)
CALCIUM SERPL-MCNC: 8.2 MG/DL — LOW (ref 8.6–10.2)
CHLORIDE SERPL-SCNC: 107 MMOL/L — SIGNIFICANT CHANGE UP (ref 98–107)
CO2 SERPL-SCNC: 24 MMOL/L — SIGNIFICANT CHANGE UP (ref 22–29)
CREAT SERPL-MCNC: 0.89 MG/DL — SIGNIFICANT CHANGE UP (ref 0.5–1.3)
EOSINOPHIL # BLD AUTO: 0.1 K/UL — SIGNIFICANT CHANGE UP (ref 0–0.5)
EOSINOPHIL NFR BLD AUTO: 1.8 % — SIGNIFICANT CHANGE UP (ref 0–5)
GLUCOSE SERPL-MCNC: 84 MG/DL — SIGNIFICANT CHANGE UP (ref 70–115)
HCT VFR BLD CALC: 21.8 % — LOW (ref 42–52)
HCT VFR BLD CALC: 24.6 % — LOW (ref 42–52)
HGB BLD-MCNC: 7.8 G/DL — LOW (ref 14–18)
HGB BLD-MCNC: 8.6 G/DL — LOW (ref 14–18)
HGB BLD-MCNC: 8.6 G/DL — LOW (ref 14–18)
LYMPHOCYTES # BLD AUTO: 2.4 K/UL — SIGNIFICANT CHANGE UP (ref 1–4.8)
LYMPHOCYTES # BLD AUTO: 31.9 % — SIGNIFICANT CHANGE UP (ref 20–55)
MAGNESIUM SERPL-MCNC: 2 MG/DL — SIGNIFICANT CHANGE UP (ref 1.6–2.6)
MCHC RBC-ENTMCNC: 31.5 PG — HIGH (ref 27–31)
MCHC RBC-ENTMCNC: 35 G/DL — SIGNIFICANT CHANGE UP (ref 32–36)
MCV RBC AUTO: 90.1 FL — SIGNIFICANT CHANGE UP (ref 80–94)
MONOCYTES # BLD AUTO: 0.5 K/UL — SIGNIFICANT CHANGE UP (ref 0–0.8)
MONOCYTES NFR BLD AUTO: 6.9 % — SIGNIFICANT CHANGE UP (ref 3–10)
NEUTROPHILS # BLD AUTO: 4.3 K/UL — SIGNIFICANT CHANGE UP (ref 1.8–8)
NEUTROPHILS NFR BLD AUTO: 58.8 % — SIGNIFICANT CHANGE UP (ref 37–73)
PLATELET # BLD AUTO: 192 K/UL — SIGNIFICANT CHANGE UP (ref 150–400)
POTASSIUM SERPL-MCNC: 4.6 MMOL/L — SIGNIFICANT CHANGE UP (ref 3.5–5.3)
POTASSIUM SERPL-SCNC: 4.6 MMOL/L — SIGNIFICANT CHANGE UP (ref 3.5–5.3)
RBC # BLD: 2.73 M/UL — LOW (ref 4.6–6.2)
RBC # FLD: 13.5 % — SIGNIFICANT CHANGE UP (ref 11–15.6)
SODIUM SERPL-SCNC: 141 MMOL/L — SIGNIFICANT CHANGE UP (ref 135–145)
WBC # BLD: 7.4 K/UL — SIGNIFICANT CHANGE UP (ref 4.8–10.8)
WBC # FLD AUTO: 7.4 K/UL — SIGNIFICANT CHANGE UP (ref 4.8–10.8)

## 2018-12-23 PROCEDURE — 99233 SBSQ HOSP IP/OBS HIGH 50: CPT

## 2018-12-23 RX ORDER — METOCLOPRAMIDE HCL 10 MG
10 TABLET ORAL ONCE
Qty: 0 | Refills: 0 | Status: DISCONTINUED | OUTPATIENT
Start: 2018-12-23 | End: 2018-12-25

## 2018-12-23 RX ADMIN — PANTOPRAZOLE SODIUM 10 MG/HR: 20 TABLET, DELAYED RELEASE ORAL at 10:48

## 2018-12-23 RX ADMIN — PANTOPRAZOLE SODIUM 10 MG/HR: 20 TABLET, DELAYED RELEASE ORAL at 23:53

## 2018-12-23 RX ADMIN — ATORVASTATIN CALCIUM 40 MILLIGRAM(S): 80 TABLET, FILM COATED ORAL at 22:21

## 2018-12-23 RX ADMIN — SODIUM CHLORIDE 100 MILLILITER(S): 9 INJECTION, SOLUTION INTRAVENOUS at 06:19

## 2018-12-23 NOTE — PROGRESS NOTE ADULT - ASSESSMENT
58 years old male with PMH of CAD s/p stents (2 years ago), HTN and HLD comes with hematemesis. As per patient, he woke up this morning and was feeling well. While taking shower, he felt some pain in epigastrium associated with nausea followed by an episode of hematemesis. He was feeling lightheaded afterwards so he came to the hospital. Denies chest pain, palpitations, shortness of breath, headache or visual symptoms.   He has constipation. Stool color is normal. Denies eating anything unusual.    He takes Aspirin 325 mg and Plavix 75 mg daily. Denies taking any OTC pain medications.     1) Upper GI Bleed  - Hold Aspirin and Plavix  - Monitor H&H   - Transfuse 1 unit of PRBCs  - CT Abd/Pelvis with IV Contrast pending  - Continue Protonix Infusion  - Clear Liquid Diet  - EGD on Monday  - GI Consult appreciated  2) HTN  - Currently BP on lower side. Did not require Lisinopril and Metoprolol in am.  3) HLD  - Continue Lipitor 40 mg  DVT Prophylaxis -- IPC

## 2018-12-23 NOTE — PROGRESS NOTE ADULT - SUBJECTIVE AND OBJECTIVE BOX
Patient seen and examined; chart reviewed.  Feels ok.  Reports no BM since admission.  No further episodes of vomiting or hematemesis.  Hgb has dropped from 10.8 to 7.8.  Bun has dropped from 37 to 22 with hydration and clear liquids.  About to receive 1 unit PRBC's appropriately.    Denies CP, SOB, palpitations.     PAST MEDICAL & SURGICAL HISTORY:  Coronary artery disease  HTN (hypertension)  HLD (hyperlipidemia)  History of coronary artery stent placement      ROS:  No Heartburn, regurgitation, dysphagia, odynophagia.  No dyspepsia  No abdominal pain.    No Nausea, vomiting.  No Bleeding.  No hematemesis.   No diarrhea.    No hematochesia.  No weight loss, anorexia.  No edema.      MEDICATIONS  (STANDING):  atorvastatin 40 milliGRAM(s) Oral at bedtime  influenza   Vaccine 0.5 milliLiter(s) IntraMuscular once  lactated ringers. 1000 milliLiter(s) (100 mL/Hr) IV Continuous <Continuous>  lisinopril 2.5 milliGRAM(s) Oral daily  metoprolol succinate ER 25 milliGRAM(s) Oral daily  pantoprazole Infusion 8 mG/Hr (10 mL/Hr) IV Continuous <Continuous>    Allergies  No Known Allergies    Vital Signs Last 24 Hrs  T(C): 37.1 (23 Dec 2018 15:28), Max: 37.4 (22 Dec 2018 23:32)  T(F): 98.8 (23 Dec 2018 15:28), Max: 99.3 (22 Dec 2018 23:32)  HR: 66 (23 Dec 2018 15:28) (66 - 75)  BP: 114/66 (23 Dec 2018 15:28) (90/53 - 114/66)  BP(mean): --  RR: 20 (23 Dec 2018 15:28) (16 - 25)  SpO2: 97% (23 Dec 2018 07:16) (97% - 100%)    PHYSICAL EXAM:    GENERAL: NAD, well-groomed, well-developed  HEAD:  Atraumatic, Normocephalic  EYES: EOMI, PERRLA, conjunctiva and sclera clear  ENMT: No tonsillar erythema, exudates, or enlargement; Moist mucous membranes, Good dentition, No lesions  NECK: Supple, No JVD, Normal thyroid  CHEST/LUNG: Clear to percussion bilaterally; No rales, rhonchi, wheezing, or rubs  HEART: Regular rate and rhythm; No murmurs, rubs, or gallops  ABDOMEN: Soft, Nontender, Nondistended; Bowel sounds present  EXTREMITIES:  2+ Peripheral Pulses, No clubbing, cyanosis, or edema  LYMPH: No lymphadenopathy noted  SKIN: No rashes or lesions      LABS:                        7.8    x     )-----------( x        ( 23 Dec 2018 11:42 )             21.8     12-23    141  |  107  |  22.0<H>  ----------------------------<  84  4.6   |  24.0  |  0.89    Ca    8.2<L>      23 Dec 2018 07:18  Mg     2.0     12-23    TPro  6.2<L>  /  Alb  3.8  /  TBili  0.2<L>  /  DBili  x   /  AST  14  /  ALT  7   /  AlkPhos  57  12-22    PT/INR - ( 22 Dec 2018 14:42 )   PT: 12.4 sec;   INR: 1.08 ratio         PTT - ( 22 Dec 2018 14:42 )  PTT:29.4 sec     EKG:  NSR;  Normal.    RADIOLOGY & ADDITIONAL STUDIES:  NAPD.

## 2018-12-23 NOTE — PROGRESS NOTE ADULT - ASSESSMENT
Hematemesis resolved.  However continued dropping hgb may be a reflection of rehydration.  VSS, therefore unlikely to still have continued bleeding.    Plan fro EGD in AM.  PRBP explained and understood.  ASA #2.  Hgb post transfusion with repeat BW in AM.  Same PPI infusion for now.  Continue with IVF.

## 2018-12-23 NOTE — PROGRESS NOTE ADULT - SUBJECTIVE AND OBJECTIVE BOX
Gastrointestinal hemorrhage      HPI:  58 years old male with PMH of CAD s/p stents (2 years ago), HTN and HLD comes with hematemesis. As per patient, he woke up this morning and was feeling well. While taking shower, he felt some pain in epigastrium associated with nausea followed by an episode of hematemesis. He was feeling lightheaded afterwards so he came to the hospital. Denies chest pain, palpitations, shortness of breath, headache or visual symptoms.   He has constipation. Stool color is normal. Denies eating anything unusual.    He takes Aspirin 325 mg and Plavix 75 mg daily. Denies taking any OTC pain medications. (22 Dec 2018 17:23)      Interval History:  Patient was seen and examined at bedside. Denies any more episodes of hematemesis. Denies black stools.   Denies chest pain, palpitations, shortness of breath, headache, dizziness, visual symptoms, nausea, vomiting or abdominal pain.    ROS:  As per interval history otherwise unremarkable.    PHYSICAL EXAM:  Vital Signs  T(C): 37 (23 Dec 2018 07:16), Max: 37.4 (22 Dec 2018 23:32)  T(F): 98.6 (23 Dec 2018 07:16), Max: 99.3 (22 Dec 2018 23:32)  HR: 70 (23 Dec 2018 07:16) (66 - 75)  BP: 105/56 (23 Dec 2018 07:16) (90/53 - 110/57)  RR: 25 (23 Dec 2018 07:16) (16 - 25)  SpO2: 97% (23 Dec 2018 07:16) (97% - 100%)  General: Well developed. Well nourished. No acute distress  HEENT: PERRLA. EOMI. Clear conjunctivae. Moist mucus membrane  Neck: Supple. No JVD. No Thyromegaly   Chest: CTA bilaterally - no wheezing, rales or rhonchi. No chest wall tenderness.  Heart: Normal S1 & S2 with RRR. No murmur.   Abdomen: Soft. Non-tender. Non-distended. + BS. ? Small lump in epigastrium.   Rectum: Guaiac positive   Ext: No pedal edema. No calf tenderness   Neuro: AAO x 3. No focal deficit. CN II-XII grossly WNL.  No speech disorder.  Skin: Warm and Dry  Psychiatry: Normal mood and affect    MEDICATIONS  (STANDING):  atorvastatin 40 milliGRAM(s) Oral at bedtime  influenza   Vaccine 0.5 milliLiter(s) IntraMuscular once  lactated ringers. 1000 milliLiter(s) (100 mL/Hr) IV Continuous <Continuous>  lisinopril 2.5 milliGRAM(s) Oral daily  metoprolol succinate ER 25 milliGRAM(s) Oral daily  pantoprazole Infusion 8 mG/Hr (10 mL/Hr) IV Continuous <Continuous>    LABS:                        7.8    x     )-----------( x        ( 23 Dec 2018 11:42 )             21.8     12-23    141  |  107  |  22.0<H>  ----------------------------<  84  4.6   |  24.0  |  0.89    Ca    8.2<L>      23 Dec 2018 07:18  Mg     2.0     12-23    TPro  6.2<L>  /  Alb  3.8  /  TBili  0.2<L>  /  DBili  x   /  AST  14  /  ALT  7   /  AlkPhos  57  12-22    PT/INR - ( 22 Dec 2018 14:42 )   PT: 12.4 sec;   INR: 1.08 ratio         PTT - ( 22 Dec 2018 14:42 )  PTT:29.4 sec

## 2018-12-24 ENCOUNTER — RESULT REVIEW (OUTPATIENT)
Age: 58
End: 2018-12-24

## 2018-12-24 LAB
ANION GAP SERPL CALC-SCNC: 8 MMOL/L — SIGNIFICANT CHANGE UP (ref 5–17)
BASOPHILS # BLD AUTO: 0 K/UL — SIGNIFICANT CHANGE UP (ref 0–0.2)
BASOPHILS NFR BLD AUTO: 0.3 % — SIGNIFICANT CHANGE UP (ref 0–2)
BUN SERPL-MCNC: 17 MG/DL — SIGNIFICANT CHANGE UP (ref 8–20)
CALCIUM SERPL-MCNC: 8.5 MG/DL — LOW (ref 8.6–10.2)
CHLORIDE SERPL-SCNC: 108 MMOL/L — HIGH (ref 98–107)
CO2 SERPL-SCNC: 23 MMOL/L — SIGNIFICANT CHANGE UP (ref 22–29)
CREAT SERPL-MCNC: 0.72 MG/DL — SIGNIFICANT CHANGE UP (ref 0.5–1.3)
EOSINOPHIL # BLD AUTO: 0.1 K/UL — SIGNIFICANT CHANGE UP (ref 0–0.5)
EOSINOPHIL NFR BLD AUTO: 2 % — SIGNIFICANT CHANGE UP (ref 0–5)
GLUCOSE SERPL-MCNC: 93 MG/DL — SIGNIFICANT CHANGE UP (ref 70–115)
HCT VFR BLD CALC: 24.6 % — LOW (ref 42–52)
HGB BLD-MCNC: 8.6 G/DL — LOW (ref 14–18)
LYMPHOCYTES # BLD AUTO: 2 K/UL — SIGNIFICANT CHANGE UP (ref 1–4.8)
LYMPHOCYTES # BLD AUTO: 29.3 % — SIGNIFICANT CHANGE UP (ref 20–55)
MAGNESIUM SERPL-MCNC: 1.9 MG/DL — SIGNIFICANT CHANGE UP (ref 1.8–2.6)
MCHC RBC-ENTMCNC: 31.4 PG — HIGH (ref 27–31)
MCHC RBC-ENTMCNC: 35 G/DL — SIGNIFICANT CHANGE UP (ref 32–36)
MCV RBC AUTO: 89.8 FL — SIGNIFICANT CHANGE UP (ref 80–94)
MONOCYTES # BLD AUTO: 0.6 K/UL — SIGNIFICANT CHANGE UP (ref 0–0.8)
MONOCYTES NFR BLD AUTO: 8.1 % — SIGNIFICANT CHANGE UP (ref 3–10)
NEUTROPHILS # BLD AUTO: 4.1 K/UL — SIGNIFICANT CHANGE UP (ref 1.8–8)
NEUTROPHILS NFR BLD AUTO: 60 % — SIGNIFICANT CHANGE UP (ref 37–73)
PLATELET # BLD AUTO: 172 K/UL — SIGNIFICANT CHANGE UP (ref 150–400)
POTASSIUM SERPL-MCNC: 3.9 MMOL/L — SIGNIFICANT CHANGE UP (ref 3.5–5.3)
POTASSIUM SERPL-SCNC: 3.9 MMOL/L — SIGNIFICANT CHANGE UP (ref 3.5–5.3)
RBC # BLD: 2.74 M/UL — LOW (ref 4.6–6.2)
RBC # FLD: 13.4 % — SIGNIFICANT CHANGE UP (ref 11–15.6)
SODIUM SERPL-SCNC: 139 MMOL/L — SIGNIFICANT CHANGE UP (ref 135–145)
WBC # BLD: 6.8 K/UL — SIGNIFICANT CHANGE UP (ref 4.8–10.8)
WBC # FLD AUTO: 6.8 K/UL — SIGNIFICANT CHANGE UP (ref 4.8–10.8)

## 2018-12-24 PROCEDURE — 88342 IMHCHEM/IMCYTCHM 1ST ANTB: CPT | Mod: 26

## 2018-12-24 PROCEDURE — 88305 TISSUE EXAM BY PATHOLOGIST: CPT | Mod: 26

## 2018-12-24 PROCEDURE — 43239 EGD BIOPSY SINGLE/MULTIPLE: CPT

## 2018-12-24 PROCEDURE — 99232 SBSQ HOSP IP/OBS MODERATE 35: CPT

## 2018-12-24 RX ORDER — PANTOPRAZOLE SODIUM 20 MG/1
40 TABLET, DELAYED RELEASE ORAL
Qty: 0 | Refills: 0 | Status: DISCONTINUED | OUTPATIENT
Start: 2018-12-24 | End: 2018-12-25

## 2018-12-24 RX ORDER — POLYETHYLENE GLYCOL 3350 17 G/17G
17 POWDER, FOR SOLUTION ORAL ONCE
Qty: 0 | Refills: 0 | Status: COMPLETED | OUTPATIENT
Start: 2018-12-24 | End: 2018-12-24

## 2018-12-24 RX ORDER — SENNA PLUS 8.6 MG/1
2 TABLET ORAL AT BEDTIME
Qty: 0 | Refills: 0 | Status: DISCONTINUED | OUTPATIENT
Start: 2018-12-24 | End: 2018-12-25

## 2018-12-24 RX ADMIN — PANTOPRAZOLE SODIUM 40 MILLIGRAM(S): 20 TABLET, DELAYED RELEASE ORAL at 17:27

## 2018-12-24 RX ADMIN — POLYETHYLENE GLYCOL 3350 17 GRAM(S): 17 POWDER, FOR SOLUTION ORAL at 17:27

## 2018-12-24 RX ADMIN — SODIUM CHLORIDE 100 MILLILITER(S): 9 INJECTION, SOLUTION INTRAVENOUS at 11:01

## 2018-12-24 RX ADMIN — ATORVASTATIN CALCIUM 40 MILLIGRAM(S): 80 TABLET, FILM COATED ORAL at 21:30

## 2018-12-24 RX ADMIN — SENNA PLUS 2 TABLET(S): 8.6 TABLET ORAL at 21:30

## 2018-12-24 NOTE — BRIEF OPERATIVE NOTE - COMMENTS
Clean based gastric ulcer likely the source of the patient's upper GI bleed.  Etiology of ulcer not currently clear.  Biopsies taken to rule out H. pylori.  Possible moderate segment of Yancey's esophagus.  Biopsies taken.    Continue pantoprazole 40 mg BID x 8 weeks, then 40 mg daily thereafter  Follow up biopsies  Patient will need repeat EGD in 8 weeks to document healing of ulcer.

## 2018-12-24 NOTE — PROGRESS NOTE ADULT - ASSESSMENT
58 years old male with PMH of CAD s/p stents (2 years ago), HTN and HLD comes with hematemesis. As per patient, he woke up this morning and was feeling well. While taking shower, he felt some pain in epigastrium associated with nausea followed by an episode of hematemesis. He was feeling lightheaded afterwards so he came to the hospital. Denies chest pain, palpitations, shortness of breath, headache or visual symptoms.   He has constipation. Stool color is normal. Denies eating anything unusual.    He takes Aspirin 325 mg and Plavix 75 mg daily. Denies taking any OTC pain medications.     1) Upper GI Bleed  - Aspirin and Plavix on hold  - Monitor H&H   - Got 1 unit of PRBCs yesterday. H&H 8.6/24.6 this am  - CT Abd/Pelvis with IV Contrast pending  - Continue Protonix Infusion  - NPO for EGD today  - GI recommendations appreciated  2) HTN  - BP on lower side. Not requiring Lisinopril and Metoprolol.  3) HLD  - Continue Lipitor 40 mg  DVT Prophylaxis -- IPC

## 2018-12-24 NOTE — BRIEF OPERATIVE NOTE - OPERATION/FINDINGS
Esophagus: A possible 3 cm segment of Yancey's esophagus was seen extending proximally from the EGJ.  4-quadrant biopsies taken.  Stomach: A 0.8 cm x 0.7 cm clean based ulcer seen at the superior aspect of the pylorus.  Random gastric biopsies taken from antrum, angularis, corpus and fundus.  Duodenum: Bulb and second portion were grossly normal in appearance.

## 2018-12-24 NOTE — PROGRESS NOTE ADULT - SUBJECTIVE AND OBJECTIVE BOX
Gastrointestinal hemorrhage      HPI:  58 years old male with PMH of CAD s/p stents (2 years ago), HTN and HLD comes with hematemesis. As per patient, he woke up this morning and was feeling well. While taking shower, he felt some pain in epigastrium associated with nausea followed by an episode of hematemesis. He was feeling lightheaded afterwards so he came to the hospital. Denies chest pain, palpitations, shortness of breath, headache or visual symptoms.   He has constipation. Stool color is normal. Denies eating anything unusual.    He takes Aspirin 325 mg and Plavix 75 mg daily. Denies taking any OTC pain medications. (22 Dec 2018 17:23)      Interval History:  Patient was seen and examined at bedside. Complaining of constipation. Denies any more episodes of hematemesis. Denies black stools.   Denies chest pain, palpitations, shortness of breath, headache, dizziness, visual symptoms, nausea, vomiting or abdominal pain.    ROS:  As per interval history otherwise unremarkable.    PHYSICAL EXAM:  Vital Signs   T(C): 36.8 (24 Dec 2018 08:01), Max: 37.1 (23 Dec 2018 15:28)  T(F): 98.2 (24 Dec 2018 08:01), Max: 98.8 (23 Dec 2018 15:28)  HR: 67 (24 Dec 2018 08:01) (65 - 71)  BP: 105/67 (24 Dec 2018 08:01) (96/51 - 114/66)  RR: 18 (24 Dec 2018 08:01) (18 - 20)  SpO2: 99% (24 Dec 2018 08:01) (99% - 99%)  General: Well developed. Well nourished. No acute distress  HEENT: PERRLA. EOMI. Clear conjunctivae. Moist mucus membrane  Neck: Supple. No JVD. No Thyromegaly   Chest: CTA bilaterally - no wheezing, rales or rhonchi. No chest wall tenderness.  Heart: Normal S1 & S2 with RRR. No murmur.   Abdomen: Soft. Non-tender. Non-distended. + BS.    Rectum: Guaiac positive   Ext: No pedal edema. No calf tenderness   Neuro: AAO x 3. No focal deficit. No speech disorder.  Skin: Warm and Dry  Psychiatry: Normal mood and affect    MEDICATIONS  (STANDING):  atorvastatin 40 milliGRAM(s) Oral at bedtime  influenza   Vaccine 0.5 milliLiter(s) IntraMuscular once  lactated ringers. 1000 milliLiter(s) (100 mL/Hr) IV Continuous <Continuous>  lisinopril 2.5 milliGRAM(s) Oral daily  metoprolol succinate ER 25 milliGRAM(s) Oral daily  pantoprazole Infusion 8 mG/Hr (10 mL/Hr) IV Continuous <Continuous>        LABS:                        8.6    6.8   )-----------( 172      ( 24 Dec 2018 07:08 )             24.6     12-24    139  |  108<H>  |  17.0  ----------------------------<  93  3.9   |  23.0  |  0.72    Ca    8.5<L>      24 Dec 2018 07:08  Mg     1.9     12-24    TPro  6.2<L>  /  Alb  3.8  /  TBili  0.2<L>  /  DBili  x   /  AST  14  /  ALT  7   /  AlkPhos  57  12-22    PT/INR - ( 22 Dec 2018 14:42 )   PT: 12.4 sec;   INR: 1.08 ratio         PTT - ( 22 Dec 2018 14:42 )  PTT:29.4 sec  CARDIAC MARKERS ( 22 Dec 2018 14:42 )  x     / <0.01 ng/mL / x     / x     / x

## 2018-12-25 ENCOUNTER — TRANSCRIPTION ENCOUNTER (OUTPATIENT)
Age: 58
End: 2018-12-25

## 2018-12-25 VITALS
TEMPERATURE: 97 F | HEART RATE: 67 BPM | RESPIRATION RATE: 18 BRPM | OXYGEN SATURATION: 97 % | SYSTOLIC BLOOD PRESSURE: 103 MMHG | DIASTOLIC BLOOD PRESSURE: 67 MMHG

## 2018-12-25 LAB
ANION GAP SERPL CALC-SCNC: 10 MMOL/L — SIGNIFICANT CHANGE UP (ref 5–17)
BASOPHILS # BLD AUTO: 0 K/UL — SIGNIFICANT CHANGE UP (ref 0–0.2)
BASOPHILS NFR BLD AUTO: 0.3 % — SIGNIFICANT CHANGE UP (ref 0–2)
BUN SERPL-MCNC: 12 MG/DL — SIGNIFICANT CHANGE UP (ref 8–20)
CALCIUM SERPL-MCNC: 8.7 MG/DL — SIGNIFICANT CHANGE UP (ref 8.6–10.2)
CHLORIDE SERPL-SCNC: 106 MMOL/L — SIGNIFICANT CHANGE UP (ref 98–107)
CO2 SERPL-SCNC: 26 MMOL/L — SIGNIFICANT CHANGE UP (ref 22–29)
CREAT SERPL-MCNC: 0.78 MG/DL — SIGNIFICANT CHANGE UP (ref 0.5–1.3)
EOSINOPHIL # BLD AUTO: 0.2 K/UL — SIGNIFICANT CHANGE UP (ref 0–0.5)
EOSINOPHIL NFR BLD AUTO: 3.1 % — SIGNIFICANT CHANGE UP (ref 0–5)
GLUCOSE SERPL-MCNC: 87 MG/DL — SIGNIFICANT CHANGE UP (ref 70–115)
HCT VFR BLD CALC: 25.1 % — LOW (ref 42–52)
HGB BLD-MCNC: 8.8 G/DL — LOW (ref 14–18)
LYMPHOCYTES # BLD AUTO: 2 K/UL — SIGNIFICANT CHANGE UP (ref 1–4.8)
LYMPHOCYTES # BLD AUTO: 28.8 % — SIGNIFICANT CHANGE UP (ref 20–55)
MAGNESIUM SERPL-MCNC: 2 MG/DL — SIGNIFICANT CHANGE UP (ref 1.6–2.6)
MCHC RBC-ENTMCNC: 31.7 PG — HIGH (ref 27–31)
MCHC RBC-ENTMCNC: 35.1 G/DL — SIGNIFICANT CHANGE UP (ref 32–36)
MCV RBC AUTO: 90.3 FL — SIGNIFICANT CHANGE UP (ref 80–94)
MONOCYTES # BLD AUTO: 0.6 K/UL — SIGNIFICANT CHANGE UP (ref 0–0.8)
MONOCYTES NFR BLD AUTO: 8.4 % — SIGNIFICANT CHANGE UP (ref 3–10)
NEUTROPHILS # BLD AUTO: 4.2 K/UL — SIGNIFICANT CHANGE UP (ref 1.8–8)
NEUTROPHILS NFR BLD AUTO: 59.3 % — SIGNIFICANT CHANGE UP (ref 37–73)
PLATELET # BLD AUTO: 190 K/UL — SIGNIFICANT CHANGE UP (ref 150–400)
POTASSIUM SERPL-MCNC: 4 MMOL/L — SIGNIFICANT CHANGE UP (ref 3.5–5.3)
POTASSIUM SERPL-SCNC: 4 MMOL/L — SIGNIFICANT CHANGE UP (ref 3.5–5.3)
RBC # BLD: 2.78 M/UL — LOW (ref 4.6–6.2)
RBC # FLD: 13.6 % — SIGNIFICANT CHANGE UP (ref 11–15.6)
SODIUM SERPL-SCNC: 142 MMOL/L — SIGNIFICANT CHANGE UP (ref 135–145)
WBC # BLD: 7.1 K/UL — SIGNIFICANT CHANGE UP (ref 4.8–10.8)
WBC # FLD AUTO: 7.1 K/UL — SIGNIFICANT CHANGE UP (ref 4.8–10.8)

## 2018-12-25 PROCEDURE — 80048 BASIC METABOLIC PNL TOTAL CA: CPT

## 2018-12-25 PROCEDURE — T1013: CPT

## 2018-12-25 PROCEDURE — 86900 BLOOD TYPING SEROLOGIC ABO: CPT

## 2018-12-25 PROCEDURE — 96374 THER/PROPH/DIAG INJ IV PUSH: CPT

## 2018-12-25 PROCEDURE — 85014 HEMATOCRIT: CPT

## 2018-12-25 PROCEDURE — 86923 COMPATIBILITY TEST ELECTRIC: CPT

## 2018-12-25 PROCEDURE — 86901 BLOOD TYPING SEROLOGIC RH(D): CPT

## 2018-12-25 PROCEDURE — 85610 PROTHROMBIN TIME: CPT

## 2018-12-25 PROCEDURE — 96375 TX/PRO/DX INJ NEW DRUG ADDON: CPT

## 2018-12-25 PROCEDURE — 88305 TISSUE EXAM BY PATHOLOGIST: CPT

## 2018-12-25 PROCEDURE — 96376 TX/PRO/DX INJ SAME DRUG ADON: CPT

## 2018-12-25 PROCEDURE — 80053 COMPREHEN METABOLIC PANEL: CPT

## 2018-12-25 PROCEDURE — 93005 ELECTROCARDIOGRAM TRACING: CPT

## 2018-12-25 PROCEDURE — 85027 COMPLETE CBC AUTOMATED: CPT

## 2018-12-25 PROCEDURE — P9016: CPT

## 2018-12-25 PROCEDURE — 86850 RBC ANTIBODY SCREEN: CPT

## 2018-12-25 PROCEDURE — 85730 THROMBOPLASTIN TIME PARTIAL: CPT

## 2018-12-25 PROCEDURE — 99285 EMERGENCY DEPT VISIT HI MDM: CPT | Mod: 25

## 2018-12-25 PROCEDURE — 84484 ASSAY OF TROPONIN QUANT: CPT

## 2018-12-25 PROCEDURE — 36430 TRANSFUSION BLD/BLD COMPNT: CPT

## 2018-12-25 PROCEDURE — 85018 HEMOGLOBIN: CPT

## 2018-12-25 PROCEDURE — 83735 ASSAY OF MAGNESIUM: CPT

## 2018-12-25 PROCEDURE — 88342 IMHCHEM/IMCYTCHM 1ST ANTB: CPT

## 2018-12-25 PROCEDURE — 99239 HOSP IP/OBS DSCHRG MGMT >30: CPT

## 2018-12-25 PROCEDURE — 82272 OCCULT BLD FECES 1-3 TESTS: CPT

## 2018-12-25 PROCEDURE — 36415 COLL VENOUS BLD VENIPUNCTURE: CPT

## 2018-12-25 PROCEDURE — 99233 SBSQ HOSP IP/OBS HIGH 50: CPT

## 2018-12-25 PROCEDURE — 83690 ASSAY OF LIPASE: CPT

## 2018-12-25 RX ORDER — PANTOPRAZOLE SODIUM 20 MG/1
1 TABLET, DELAYED RELEASE ORAL
Qty: 60 | Refills: 0 | OUTPATIENT
Start: 2018-12-25 | End: 2019-01-23

## 2018-12-25 RX ORDER — ASPIRIN/CALCIUM CARB/MAGNESIUM 324 MG
1 TABLET ORAL
Qty: 30 | Refills: 0 | OUTPATIENT
Start: 2018-12-25 | End: 2019-01-23

## 2018-12-25 RX ORDER — ASCORBIC ACID 60 MG
1 TABLET,CHEWABLE ORAL
Qty: 30 | Refills: 0 | OUTPATIENT
Start: 2018-12-25 | End: 2019-01-23

## 2018-12-25 RX ORDER — SENNA PLUS 8.6 MG/1
2 TABLET ORAL
Qty: 30 | Refills: 0 | OUTPATIENT
Start: 2018-12-25 | End: 2019-01-08

## 2018-12-25 RX ORDER — FERROUS SULFATE 325(65) MG
1 TABLET ORAL
Qty: 90 | Refills: 0 | OUTPATIENT
Start: 2018-12-25 | End: 2019-01-23

## 2018-12-25 RX ADMIN — PANTOPRAZOLE SODIUM 40 MILLIGRAM(S): 20 TABLET, DELAYED RELEASE ORAL at 05:14

## 2018-12-25 NOTE — DISCHARGE NOTE ADULT - PLAN OF CARE
Resolved Hold Plavix.  Decrease Aspirin to 81 mg daily.  Protonix 40 mg BID for 8 weeks then once a day.   Follow up with GI in 1-2 weeks. Plan as above. Hold Plavix.  Decrease Aspirin to 81 mg daily.  Continue other home medications.  Follow up with Cardio in 1 week for further recommendations for Plavix. Continue home medications.   Follow up with PMD in 1 week.

## 2018-12-25 NOTE — DISCHARGE NOTE ADULT - CARE PROVIDER_API CALL
Des Guillermo), Gastroenterology; Internal Medicine  [address]  [address]  [place] [address]  Phone: [phone]  Fax: [phone]    Gumaro Melo), Cardiovascular Disease; Internal Medicine  [address] [place] [address]  Phone: [phone]  Fax: [phone]    KERI Avalos (PMD)  [address], [place] [address]  Phone: [phone]  Fax: (   )    -    [facility]bie, (for Primary Care)  [address], [place] [address]  Phone: [phone]  Fax: (   )    -

## 2018-12-25 NOTE — DISCHARGE NOTE ADULT - PROVIDER TOKENS
TOKEN:'[id number]',TOKEN:'[email or web address]',FREE:[LAST:[[facility] Robbie],FIRST:[(PMD)],PHONE:[phone]:[(   )    -],ADDRESS:[[address] [place], [place] [address] [address]]],FREE:[LAST:[[facility] Robbie],FIRST:[(for Primary Care)],PHONE:[phone]],FAX:[(   )    -],ADDRESS:[[address] [place], [place] [address] [address]]]

## 2018-12-25 NOTE — DISCHARGE NOTE ADULT - CARE PLAN
Principal Discharge DX:	Upper GI bleed  Goal:	Resolved  Assessment and plan of treatment:	Hold Plavix.  Decrease Aspirin to 81 mg daily.  Protonix 40 mg BID for 8 weeks then once a day.   Follow up with GI in 1-2 weeks.  Secondary Diagnosis:	Gastric ulcer  Assessment and plan of treatment:	Plan as above.  Secondary Diagnosis:	Coronary artery disease  Assessment and plan of treatment:	Hold Plavix.  Decrease Aspirin to 81 mg daily.  Continue other home medications.  Follow up with Cardio in 1 week for further recommendations for Plavix.  Secondary Diagnosis:	HTN (hypertension)  Assessment and plan of treatment:	Continue home medications.   Follow up with PMD in 1 week.  Secondary Diagnosis:	HLD (hyperlipidemia)  Assessment and plan of treatment:	Continue home medications.   Follow up with PMD in 1 week.

## 2018-12-25 NOTE — DISCHARGE NOTE ADULT - PATIENT PORTAL LINK FT
You can access the [facility] Patient Portal, offered by [facility], by registering with the following website: [email or web address]

## 2018-12-25 NOTE — PROGRESS NOTE ADULT - SUBJECTIVE AND OBJECTIVE BOX
58y Male s/p EGD with biopsy on 12/24/18     T(C): 36.3 (12-25-18 @ 08:15), Max: 37 (12-25-18 @ 05:13)  HR: 67 (12-25-18 @ 08:15) (60 - 67)  BP: 103/67 (12-25-18 @ 08:15) (100/53 - 117/67)  RR: 18 (12-25-18 @ 08:15) (18 - 18)  SpO2: 97% (12-25-18 @ 08:15) (97% - 98%)  Wt(kg): --    Pt doing well, no anesthesia complications or complaints noted.   No Nausea  Pain well controlled

## 2018-12-25 NOTE — PROGRESS NOTE ADULT - ASSESSMENT
Again, this is a very pleasant 58-year-old gentleman for whom GI was consulted for hematemesis and was found to have a pyloric clean based ulcer.  Patient appears to be doing very well.  He should continue on pantoprazole 40 mg BID x 8 weeks and should follow up with Dr. Des Guillermo in the office in approximately 4-6 weeks.    Thank you for the consult.  Please do not hesitate to call with any questions or concerns.    ZAID Menchaca MD  [facility] Physician [facility]  Division of Gastroenterology  Tel [phone]  Fax [phone]  12-25-18 @ 15:04

## 2018-12-25 NOTE — DISCHARGE NOTE ADULT - HOSPITAL COURSE
Patient was admitted with Hematemesis / Upper GI Bleed. He was started on Protonix Infusion and was transfused one unit of PRBCs. He was seen by GI and was taken for EGD on 12/24/18 which showed:  Esophagus: A possible 3 cm segment of Yancey's esophagus was seen extending proximally from the EGJ.  4-quadrant biopsies taken.  Stomach: A 0.8 cm x 0.7 cm clean based ulcer seen at the superior aspect of the pylorus.  Random gastric biopsies taken from antrum, angularis, corpus and fundus.  Duodenum: Bulb and second portion were grossly normal in appearance.  Postprocedure, he was monitored closely. He is tolerating PO and his H&H is stable. GI is recommending to hold Plavix for now and decrease Aspirin to 81 mg . He will follow up with Cardio for further recommendations. He is stable to be discharged home with outpatient follow up with GI for biopsy results and further recommendations.    Vital Signs   T(C): 36.3 (25 Dec 2018 08:15), Max: 37 (25 Dec 2018 05:13)  T(F): 97.3 (25 Dec 2018 08:15), Max: 98.6 (25 Dec 2018 05:13)  HR: 67 (25 Dec 2018 08:15) (60 - 67)  BP: 103/67 (25 Dec 2018 08:15) (100/53 - 117/67)  RR: 18 (25 Dec 2018 08:15) (18 - 18)  SpO2: 97% (25 Dec 2018 08:15) (97% - 98%)  General: Well developed. Well nourished. No acute distress  HEENT: PERRLA. EOMI. Clear conjunctivae. Moist mucus membrane  Neck: Supple. No JVD. No Thyromegaly   Chest: CTA bilaterally - no wheezing, rales or rhonchi. No chest wall tenderness.  Heart: Normal S1 & S2 with RRR. No murmur.   Abdomen: Soft. Non-tender. Non-distended. + BS  Ext: No pedal edema. No calf tenderness   Neuro: AAO x 3. No focal deficit. No speech disorder.  Skin: Warm and Dry  Psychiatry: Normal mood and affect    Time spent: 42 minutes

## 2018-12-25 NOTE — DISCHARGE NOTE ADULT - ADDITIONAL INSTRUCTIONS
Hold Plavix.  Decrease Aspirin to 81 mg daily.  Protonix 40 mg BID for 8 weeks then once a day.   Follow up with GI in 1-2 weeks for biopsies and further recommendations.  Follow up with Cardio in 1 week for further recommendations for Plavix.  Follow up with PMD in 1 week.

## 2018-12-25 NOTE — PROGRESS NOTE ADULT - SUBJECTIVE AND OBJECTIVE BOX
Chief Complaint: This is a 58y old Male patient being seen for follow-up consultation for hematemesis.    HPI:  Patient underwent EGD yesterday afternoon that revealed a clean-based pyloric ulcer that was the likely source of bleeding.  Patient reports feeling well today and is planned for discharge.    ROS: A 14-point review of systems was reviewed and was otherwise negative save what was reported in the HPI.    PAST MEDICAL/SURGICAL HISTORY:  Coronary artery disease  HTN (hypertension)  HLD (hyperlipidemia)  History of coronary artery stent placement    MEDICATIONS  (STANDING):  atorvastatin 40 milliGRAM(s) Oral at bedtime  influenza   Vaccine 0.5 milliLiter(s) IntraMuscular once  lisinopril 2.5 milliGRAM(s) Oral daily  metoclopramide Injectable 10 milliGRAM(s) IV Push once  metoprolol succinate ER 25 milliGRAM(s) Oral daily  pantoprazole    Tablet 40 milliGRAM(s) Oral two times a day  senna 2 Tablet(s) Oral at bedtime    MEDICATIONS  (PRN):    VITAL SIGNS LAST 24 HOURS:  T(C): 36.3 (25 Dec 2018 08:15), Max: 37 (25 Dec 2018 05:13)  T(F): 97.3 (25 Dec 2018 08:15), Max: 98.6 (25 Dec 2018 05:13)  HR: 67 (25 Dec 2018 08:15) (60 - 67)  BP: 103/67 (25 Dec 2018 08:15) (100/53 - 117/67)  RR: 18 (25 Dec 2018 08:15) (18 - 18)  SpO2: 97% (25 Dec 2018 08:15) (97% - 98%)      PHYSICAL EXAM:  Constitutional: Well-developed, well-nourished, in no apparent distress  Eyes: Sclerae anicteric, conjunctivae normal  ENMT: Mucus membranes moist, no oropharyngeal thrush noted  Neck: No thyroid nodules appreciated, no significant cervical or supraclavicular lymphadenopathy  Respiratory: Breathing nonlabored; clear to auscultation  Cardiovascular: Regular rate and rhythm  Gastrointestinal: Soft, nontender, nondistended, normoactive bowel sounds; no hepatosplenomegaly appreciated; no rebound tenderness or involuntary guarding  Extremities: No clubbing, cyanosis or edema  Neurological: Alert and oriented to person, place and time; no asterixis  Skin: No jaundice  Lymph Nodes: No significant lymphadenopathy  Musculoskeletal: No significant peripheral atrophy  Psychiatric: Affect and mood appropriate                            8.8    7.1   )-----------( 190      ( 25 Dec 2018 07:37 )             25.1     12-25    142  |  106  |  12.0  ----------------------------<  87  4.0   |  26.0  |  0.78    Ca    8.7      25 Dec 2018 07:37  Mg     2.0     12-25

## 2018-12-25 NOTE — DISCHARGE NOTE ADULT - MEDICATION SUMMARY - MEDICATIONS TO TAKE
I will START or STAY ON the medications listed below when I get home from the hospital:    Aspirin Enteric Coated 81 mg oral delayed release tablet  -- 1 tab(s) by mouth once a day   -- Swallow whole.  Do not crush.  Take with food or milk.    -- Indication: For Coronary artery disease    lisinopril 2.5 mg oral tablet  -- 1 tab(s) by mouth once a day  -- Indication: For HTN (hypertension)    atorvastatin 40 mg oral tablet  -- 1 tab(s) by mouth once a day  -- Indication: For Coronary artery disease / HLD    metoprolol succinate 25 mg oral tablet, extended release  -- 1 tab(s) by mouth once a day  -- Indication: For Coronary artery disease / HTN    FeroSul 325 mg (65 mg elemental iron) oral tablet  -- 1 tab(s) by mouth 3 times a day   -- Check with your doctor before becoming pregnant.  Do not chew, break, or crush.  May discolor urine or feces.    -- Indication: For Anemia    senna oral tablet  -- 2 tab(s) by mouth once a day, As Needed for constipation  -- Indication: For Constipation    pantoprazole 40 mg oral delayed release tablet  -- 1 tab(s) by mouth 2 times a day  -- Indication: For Gastric Ulcer    Vitamin C 500 mg oral tablet  -- 1 tab(s) by mouth once a day   -- Indication: For Vitamin Supplement

## 2018-12-25 NOTE — DISCHARGE NOTE ADULT - CARE PROVIDERS DIRECT ADDRESSES
,[email or web address],[email or web address]

## 2018-12-26 ENCOUNTER — INBOUND DOCUMENT (OUTPATIENT)
Age: 58
End: 2018-12-26

## 2018-12-28 LAB — SURGICAL PATHOLOGY STUDY: SIGNIFICANT CHANGE UP

## 2018-12-31 ENCOUNTER — INBOUND DOCUMENT (OUTPATIENT)
Age: 58
End: 2018-12-31

## 2019-01-02 PROBLEM — E78.5 HYPERLIPIDEMIA, UNSPECIFIED: Chronic | Status: ACTIVE | Noted: 2018-12-22

## 2019-01-02 PROBLEM — I25.10 ATHEROSCLEROTIC HEART DISEASE OF NATIVE CORONARY ARTERY WITHOUT ANGINA PECTORIS: Chronic | Status: ACTIVE | Noted: 2018-12-22

## 2019-01-02 PROBLEM — I10 ESSENTIAL (PRIMARY) HYPERTENSION: Chronic | Status: ACTIVE | Noted: 2018-12-22

## 2019-01-09 ENCOUNTER — APPOINTMENT (OUTPATIENT)
Dept: CARDIOLOGY | Facility: CLINIC | Age: 59
End: 2019-01-09

## 2019-01-09 ENCOUNTER — OUTPATIENT (OUTPATIENT)
Dept: OUTPATIENT SERVICES | Facility: HOSPITAL | Age: 59
LOS: 1 days | End: 2019-01-09
Payer: SELF-PAY

## 2019-01-09 DIAGNOSIS — Z95.5 PRESENCE OF CORONARY ANGIOPLASTY IMPLANT AND GRAFT: Chronic | ICD-10-CM

## 2019-01-09 DIAGNOSIS — I25.10 ATHEROSCLEROTIC HEART DISEASE OF NATIVE CORONARY ARTERY WITHOUT ANGINA PECTORIS: ICD-10-CM

## 2019-01-09 PROCEDURE — G0463: CPT

## 2019-01-10 ENCOUNTER — APPOINTMENT (OUTPATIENT)
Dept: GASTROENTEROLOGY | Facility: CLINIC | Age: 59
End: 2019-01-10

## 2019-03-27 ENCOUNTER — APPOINTMENT (OUTPATIENT)
Dept: CARDIOLOGY | Facility: CLINIC | Age: 59
End: 2019-03-27

## 2019-04-08 NOTE — HISTORY OF PRESENT ILLNESS
[FreeTextEntry1] : Mr. Mccollum presents today for follow up evaluation status post a recent [facility] admission in late December 2018 for a pyloric ulcer.  His plavix was discontinued and aspirin dose was reduced to 81mg daily.  The patient was found to be anemic and received a blood transfusion.  He states that his leg cramping was relieved with holding the atorvastatin.  He also experienced some mild chest discomfort a few days ago after lifting a small refrigerator.   States the pain was reproducible with palpations and has since subsided.

## 2019-04-08 NOTE — REASON FOR VISIT
[FreeTextEntry1] : The patient is a 58 y.o.  male who presents today for the evaluation and management of hypertension, hyperlipidemia, coronary artery disease status post multi-stenting.

## 2019-04-08 NOTE — PHYSICAL EXAM
[General Appearance - Well Developed] : well developed [General Appearance - In No Acute Distress] : no acute distress [Normal Conjunctiva] : the conjunctiva exhibited no abnormalities [Normal Oral Mucosa] : normal oral mucosa [] : no respiratory distress [Auscultation Breath Sounds / Voice Sounds] : lungs were clear to auscultation bilaterally [Heart Rate And Rhythm] : heart rate and rhythm were normal [Heart Sounds] : normal S1 and S2 [Murmurs] : no murmurs present [Edema] : no peripheral edema present [Bowel Sounds] : normal bowel sounds [Abnormal Walk] : normal gait [Skin Color & Pigmentation] : normal skin color and pigmentation [Skin Turgor] : normal skin turgor [Oriented To Time, Place, And Person] : oriented to person, place, and time [Affect] : the affect was normal [Mood] : the mood was normal [FreeTextEntry1] : Without edema

## 2019-05-03 NOTE — PATIENT PROFILE ADULT - NSPROIMPLANTSMEDDEV_GEN_A_NUR
None Paramedian Forehead Flap Text: A decision was made to reconstruct the defect utilizing an interpolation axial flap and a staged reconstruction.  A telfa template was made of the defect.  This telfa template was then used to outline the paramedian forehead pedicle flap.  The donor area for the pedicle flap was then injected with anesthesia.  The flap was excised through the skin and subcutaneous tissue down to the layer of the underlying musculature.  The pedicle flap was carefully excised within this deep plane to maintain its blood supply.  The edges of the donor site were undermined.   The donor site was closed in a primary fashion.  The pedicle was then rotated into position and sutured.  Once the tube was sutured into place, adequate blood supply was confirmed with blanching and refill.  The pedicle was then wrapped with xeroform gauze and dressed appropriately with a telfa and gauze bandage to ensure continued blood supply and protect the attached pedicle.

## 2019-08-14 ENCOUNTER — OUTPATIENT (OUTPATIENT)
Dept: OUTPATIENT SERVICES | Facility: HOSPITAL | Age: 59
LOS: 1 days | End: 2019-08-14
Payer: SELF-PAY

## 2019-08-14 ENCOUNTER — APPOINTMENT (OUTPATIENT)
Dept: CARDIOLOGY | Facility: CLINIC | Age: 59
End: 2019-08-14

## 2019-08-14 VITALS
HEART RATE: 63 BPM | WEIGHT: 199 LBS | SYSTOLIC BLOOD PRESSURE: 126 MMHG | RESPIRATION RATE: 14 BRPM | BODY MASS INDEX: 27.86 KG/M2 | HEIGHT: 71 IN | DIASTOLIC BLOOD PRESSURE: 85 MMHG

## 2019-08-14 VITALS — SYSTOLIC BLOOD PRESSURE: 133 MMHG | HEART RATE: 60 BPM | DIASTOLIC BLOOD PRESSURE: 82 MMHG

## 2019-08-14 DIAGNOSIS — D64.9 ANEMIA, UNSPECIFIED: ICD-10-CM

## 2019-08-14 DIAGNOSIS — D58.0 HEREDITARY SPHEROCYTOSIS: ICD-10-CM

## 2019-08-14 DIAGNOSIS — I25.10 ATHEROSCLEROTIC HEART DISEASE OF NATIVE CORONARY ARTERY WITHOUT ANGINA PECTORIS: ICD-10-CM

## 2019-08-14 DIAGNOSIS — K25.9 GASTRIC ULCER, UNSPECIFIED AS ACUTE OR CHRONIC, W/OUT HEMORRHAGE OR PERFORATION: ICD-10-CM

## 2019-08-14 DIAGNOSIS — Z95.5 PRESENCE OF CORONARY ANGIOPLASTY IMPLANT AND GRAFT: Chronic | ICD-10-CM

## 2019-08-14 PROCEDURE — G0463: CPT

## 2019-11-04 ENCOUNTER — OUTPATIENT (OUTPATIENT)
Dept: OUTPATIENT SERVICES | Facility: HOSPITAL | Age: 59
LOS: 1 days | End: 2019-11-04
Payer: SELF-PAY

## 2019-11-04 DIAGNOSIS — Z00.8 ENCOUNTER FOR OTHER GENERAL EXAMINATION: ICD-10-CM

## 2019-11-04 DIAGNOSIS — Z95.5 PRESENCE OF CORONARY ANGIOPLASTY IMPLANT AND GRAFT: Chronic | ICD-10-CM

## 2019-11-04 LAB
ALBUMIN SERPL ELPH-MCNC: 4.3 G/DL — SIGNIFICANT CHANGE UP (ref 3.3–5.2)
ALP SERPL-CCNC: 76 U/L — SIGNIFICANT CHANGE UP (ref 40–120)
ALT FLD-CCNC: 12 U/L — SIGNIFICANT CHANGE UP
ANION GAP SERPL CALC-SCNC: 13 MMOL/L — SIGNIFICANT CHANGE UP (ref 5–17)
AST SERPL-CCNC: 17 U/L — SIGNIFICANT CHANGE UP
BASOPHILS # BLD AUTO: 0.05 K/UL — SIGNIFICANT CHANGE UP (ref 0–0.2)
BASOPHILS NFR BLD AUTO: 0.7 % — SIGNIFICANT CHANGE UP (ref 0–2)
BILIRUB DIRECT SERPL-MCNC: 0.1 MG/DL — SIGNIFICANT CHANGE UP (ref 0–0.3)
BILIRUB INDIRECT FLD-MCNC: 0.2 MG/DL — SIGNIFICANT CHANGE UP (ref 0.2–1)
BILIRUB SERPL-MCNC: 0.3 MG/DL — LOW (ref 0.4–2)
BUN SERPL-MCNC: 15 MG/DL — SIGNIFICANT CHANGE UP (ref 8–20)
CALCIUM SERPL-MCNC: 9.4 MG/DL — SIGNIFICANT CHANGE UP (ref 8.6–10.2)
CHLORIDE SERPL-SCNC: 105 MMOL/L — SIGNIFICANT CHANGE UP (ref 98–107)
CHOLEST SERPL-MCNC: 178 MG/DL — SIGNIFICANT CHANGE UP (ref 110–199)
CO2 SERPL-SCNC: 25 MMOL/L — SIGNIFICANT CHANGE UP (ref 22–29)
CREAT SERPL-MCNC: 0.9 MG/DL — SIGNIFICANT CHANGE UP (ref 0.5–1.3)
EOSINOPHIL # BLD AUTO: 0.1 K/UL — SIGNIFICANT CHANGE UP (ref 0–0.5)
EOSINOPHIL NFR BLD AUTO: 1.4 % — SIGNIFICANT CHANGE UP (ref 0–6)
GLUCOSE SERPL-MCNC: 97 MG/DL — SIGNIFICANT CHANGE UP (ref 70–115)
HBA1C BLD-MCNC: 5 % — SIGNIFICANT CHANGE UP (ref 4–5.6)
HCT VFR BLD CALC: 42.7 % — SIGNIFICANT CHANGE UP (ref 39–50)
HDLC SERPL-MCNC: 53 MG/DL — SIGNIFICANT CHANGE UP
HGB BLD-MCNC: 14.5 G/DL — SIGNIFICANT CHANGE UP (ref 13–17)
IMM GRANULOCYTES NFR BLD AUTO: 0.3 % — SIGNIFICANT CHANGE UP (ref 0–1.5)
LIPID PNL WITH DIRECT LDL SERPL: 92 MG/DL — SIGNIFICANT CHANGE UP
LYMPHOCYTES # BLD AUTO: 2.33 K/UL — SIGNIFICANT CHANGE UP (ref 1–3.3)
LYMPHOCYTES # BLD AUTO: 33.2 % — SIGNIFICANT CHANGE UP (ref 13–44)
MCHC RBC-ENTMCNC: 31 PG — SIGNIFICANT CHANGE UP (ref 27–34)
MCHC RBC-ENTMCNC: 34 GM/DL — SIGNIFICANT CHANGE UP (ref 32–36)
MCV RBC AUTO: 91.4 FL — SIGNIFICANT CHANGE UP (ref 80–100)
MONOCYTES # BLD AUTO: 0.55 K/UL — SIGNIFICANT CHANGE UP (ref 0–0.9)
MONOCYTES NFR BLD AUTO: 7.8 % — SIGNIFICANT CHANGE UP (ref 2–14)
NEUTROPHILS # BLD AUTO: 3.97 K/UL — SIGNIFICANT CHANGE UP (ref 1.8–7.4)
NEUTROPHILS NFR BLD AUTO: 56.6 % — SIGNIFICANT CHANGE UP (ref 43–77)
PLATELET # BLD AUTO: 212 K/UL — SIGNIFICANT CHANGE UP (ref 150–400)
POTASSIUM SERPL-MCNC: 4.7 MMOL/L — SIGNIFICANT CHANGE UP (ref 3.5–5.3)
POTASSIUM SERPL-SCNC: 4.7 MMOL/L — SIGNIFICANT CHANGE UP (ref 3.5–5.3)
PROT SERPL-MCNC: 7.2 G/DL — SIGNIFICANT CHANGE UP (ref 6.6–8.7)
PSA SERPL-MCNC: 0.57 NG/ML — SIGNIFICANT CHANGE UP (ref 0–4)
RBC # BLD: 4.67 M/UL — SIGNIFICANT CHANGE UP (ref 4.2–5.8)
RBC # FLD: 13.1 % — SIGNIFICANT CHANGE UP (ref 10.3–14.5)
SODIUM SERPL-SCNC: 143 MMOL/L — SIGNIFICANT CHANGE UP (ref 135–145)
TOTAL CHOLESTEROL/HDL RATIO MEASUREMENT: 3 RATIO — LOW (ref 3.4–9.6)
TRIGL SERPL-MCNC: 165 MG/DL — SIGNIFICANT CHANGE UP (ref 10–200)
TSH SERPL-MCNC: 2.97 UIU/ML — SIGNIFICANT CHANGE UP (ref 0.27–4.2)
WBC # BLD: 7.02 K/UL — SIGNIFICANT CHANGE UP (ref 3.8–10.5)
WBC # FLD AUTO: 7.02 K/UL — SIGNIFICANT CHANGE UP (ref 3.8–10.5)

## 2019-11-04 PROCEDURE — 36415 COLL VENOUS BLD VENIPUNCTURE: CPT

## 2019-11-04 PROCEDURE — 83036 HEMOGLOBIN GLYCOSYLATED A1C: CPT

## 2019-11-04 PROCEDURE — 80053 COMPREHEN METABOLIC PANEL: CPT

## 2019-11-04 PROCEDURE — G0463: CPT

## 2019-11-04 PROCEDURE — 85027 COMPLETE CBC AUTOMATED: CPT

## 2019-11-04 PROCEDURE — 84153 ASSAY OF PSA TOTAL: CPT

## 2019-11-04 PROCEDURE — 82248 BILIRUBIN DIRECT: CPT

## 2019-11-04 PROCEDURE — 80061 LIPID PANEL: CPT

## 2019-11-04 PROCEDURE — 84443 ASSAY THYROID STIM HORMONE: CPT

## 2019-11-12 NOTE — DISCUSSION/SUMMARY
[FreeTextEntry1] : 1 - Continue all current medications.\par 2 - Follow strict low sodium diet.\par 3 - Fasting blood work prior to next visit.\par 4 - Follow up in 3 months.

## 2019-11-12 NOTE — HISTORY OF PRESENT ILLNESS
[FreeTextEntry1] : Mr. Mccollum presents today for follow up evaluation.  Presently he is without complaints of chest pain, shortness of breath, palpitations, lightheadedness or syncope.  He has been tolerating the Crestor pretty well.  States he gets an occasional leg discomfort, but not on a daily basis.  Did not get his fasting blood work done.

## 2019-11-12 NOTE — ASSESSMENT
[FreeTextEntry1] : 1 - Hypertension:  well controlled on current medications.\par 2 - Coronary artery disease status-post multi-stenting:  stable at this time. No complaints of chest pain, shortness of breath, palpitations, lightheadedness or syncope.\par 3 - Hypercholesterolemia:  occasional mild leg discomfort with Crestor

## 2019-11-12 NOTE — REASON FOR VISIT
[FreeTextEntry1] : The patient is a 59 year old  male with a past medical history significant for hypertension, hyperlipidemia and coronary artery disease status-post multi-stenting who presents today for follow up evaluation.

## 2019-11-13 ENCOUNTER — APPOINTMENT (OUTPATIENT)
Dept: CARDIOLOGY | Facility: CLINIC | Age: 59
End: 2019-11-13

## 2019-11-13 ENCOUNTER — OUTPATIENT (OUTPATIENT)
Dept: OUTPATIENT SERVICES | Facility: HOSPITAL | Age: 59
LOS: 1 days | End: 2019-11-13
Payer: SELF-PAY

## 2019-11-13 VITALS — RESPIRATION RATE: 14 BRPM | HEART RATE: 61 BPM | DIASTOLIC BLOOD PRESSURE: 90 MMHG | SYSTOLIC BLOOD PRESSURE: 164 MMHG

## 2019-11-13 DIAGNOSIS — Z95.5 PRESENCE OF CORONARY ANGIOPLASTY IMPLANT AND GRAFT: Chronic | ICD-10-CM

## 2019-11-13 DIAGNOSIS — I25.10 ATHEROSCLEROTIC HEART DISEASE OF NATIVE CORONARY ARTERY WITHOUT ANGINA PECTORIS: ICD-10-CM

## 2019-11-13 PROCEDURE — G0463: CPT

## 2019-12-05 NOTE — PATIENT PROFILE ADULT - NSPROMEDSHERBAL_GEN_A_NUR
NP aware & ordered Insulin Lispro 3 Units Subcutaneous to be administered now before patient's eats her Lunch Meal. no

## 2019-12-17 NOTE — DISCUSSION/SUMMARY
Initial / Assessment/Plan of Care Note     Baseline Assessment  80 year old admitted 12/16/2019 as Inpatient with a diagnosis of upper GI bleed.   Prior to admission patient was living with Alone and residing [facility].  Patient does  have a Power of  for Healthcare.  Document is activated.  Agent is stefano DumontJr.  Patient’s Primary Care Provider is Adrian Keane MD.     Medical History  Past Medical History:   Diagnosis Date   • A-fib (CMS/[facility])    • Alzheimer's dementia (CMS/[facility]) 10/2/2014   • Anxiety    • Behavioral and emotional disorders with onset usually occurring in childhood and adolescence 10/2/2014   • BPH (benign prostatic hyperplasia)    • CAD (coronary artery disease)    • Cardiomyopathy, dilated (CMS/[facility]) 2/9/2018   • Cerebrovascular disease    • CHF (congestive heart failure) (CMS/[facility])    • Chronic atrial fibrillation    • Chronic back pain    • CVA (cerebral vascular accident) (CMS/[facility])    • Degenerative joint disease    • Dementia (CMS/[facility])    • Diabetes (CMS/[facility])    • Fall at home 10/2/2014   • History of kidney stones    • HTN (hypertension)    • Hyperlipidemia    • Hypothyroidism    • Ischemic cardiomyopathy    • Major depressive disorder    • Memory loss 10/2/2014   • Old myocardial infarction    • Osteopenia    • Pulmonary HTN (CMS/[facility])    • Sciatica     left       Prior to Admission Status  Functional Status  Ambulation: Independent/Self  Bathing: Independent/Self, Facility Staff  Dressing: Independent/Self  Toileting: Independent/Self  Meal Preparation: Facility Staff  Medication Preparation: Pharmacy Setup  Medication Administration: Staff Administered  Housekeeping: Homemaker  Transportation: Family    Agency/Support  Type of Services Prior to Hospitalization: None  Support Systems: Children, Family members  Home Devices/Equipment: Sensory assist device  Mobility Assist Devices: None  Sensory Support Devices: None    Current Status  PT Ambulation  Tips:    PT Transfer Tips:    OT Bathing Tips:    OT Dressing Tips:    OT Toileting Tips:    OT Feeding Tips:    SLP Swallow/Feeding Tips:    SLP Comm/Cog Tips:    Current Mental Status:    Stressors:      Insurance  Primary: [employer] MEDICARE  Secondary: [place] MEDICAID    Barriers to Discharge  Identified Barriers to Discharge/Transition Planning: Assessment/stabilization in progress, PT/OT clearance, Unresolved medical issues    Progress Note  SW follow up on return to SNF trigger.  SW visit to Pt on this date.  SW introduced self and role to son via phone as Pt has baseline dementia and activated [facility].  Pt resides at [facility].  Pt has been residing at facility for several years.  Pt receives cares, meals and medications from SNF staff.  Son Santiago is supportive and agreeable to SNF return when medically stable.  SW e-faxed clinical updates to [facility].  Pt has T19 bedhold.  SW will continue to follow and assist with planning as needed.      Plan  SW/CM - Recommendations for Discharge: SNF  PT - Recommendations for Discharge:    OT - Recommendations for Discharge:    SLP - Recommendations for Discharge:    Anticipate patient will need post-hospital services. Necessary services are available.  Anticipate patient can return to the environment from which patient entered the hospital.   Anticipate patient cannot provide self-care at discharge.    Refer to SW/CM Flowsheet for Goals and objective data.     Samia Ames MSW, APSW     [FreeTextEntry1] : Discontinue atorvastatin.\par Start Crestor 10mg daily\par Continue all other medications.\par Labs in 6-8 weeks\par F/U in  2 months.

## 2020-02-10 NOTE — ASSESSMENT
[FreeTextEntry1] : 1 - Hypertension:  submaximally controlled as he ran out of some of his medications.\par 2 - Hyperlipidemia:  cholesterol 192, HDL 53, LDL 92 (not at target of 70), triglycerides 165, TC/HDL 3.0\par 3 - Coronary artery disease status-post multi-stenting:  without chest pain, shortness of breath, palpitations, lightheadedness or syncope.\par 4 - Labs: glucose 97, HgA1c 5, TSH 2.97, H/H 14.5/42.7, BUN 15, creatinine 0.9, potassium 4.7

## 2020-02-10 NOTE — PHYSICAL EXAM
[General Appearance - In No Acute Distress] : no acute distress [General Appearance - Well Developed] : well developed [Normal Conjunctiva] : the conjunctiva exhibited no abnormalities [Normal Oral Mucosa] : normal oral mucosa [] : no respiratory distress [Auscultation Breath Sounds / Voice Sounds] : lungs were clear to auscultation bilaterally [Heart Rate And Rhythm] : heart rate and rhythm were normal [Heart Sounds] : normal S1 and S2 [Edema] : no peripheral edema present [Murmurs] : no murmurs present [Bowel Sounds] : normal bowel sounds [Abnormal Walk] : normal gait [Skin Turgor] : normal skin turgor [Skin Color & Pigmentation] : normal skin color and pigmentation [Oriented To Time, Place, And Person] : oriented to person, place, and time [Affect] : the affect was normal [Mood] : the mood was normal [FreeTextEntry1] : Without edema

## 2020-02-10 NOTE — DISCUSSION/SUMMARY
[FreeTextEntry1] : 1 - Rx given for medications\par 2 - Take crestor daily.\par 3 - Low fat, low cholesterol, low sodium diet\par 4 - Exercise and weight loss\par 5 - Fasting blood work\par 6 - F/U in 3 months

## 2020-02-10 NOTE — HISTORY OF PRESENT ILLNESS
[FreeTextEntry1] : Mr. Mccollum presents today for follow up evaluation.  Presently without complaints of exertional chest pain, shortness of breath, palpitations, lightheadedness or syncope.  Admits he has not been consistent with taking his crestor on a daily basis.  He is also in need of refills of some of his medications as he has run out.

## 2020-02-10 NOTE — REASON FOR VISIT
[FreeTextEntry1] : The patient is a 59 year-old  male with a past medical history significant for hypertension, hyperlipidemia and coronary artery disease status-post multi-stenting who presents for follow up evaluation.

## 2020-02-19 ENCOUNTER — OUTPATIENT (OUTPATIENT)
Dept: OUTPATIENT SERVICES | Facility: HOSPITAL | Age: 60
LOS: 1 days | End: 2020-02-19
Payer: SELF-PAY

## 2020-02-19 DIAGNOSIS — Z00.8 ENCOUNTER FOR OTHER GENERAL EXAMINATION: ICD-10-CM

## 2020-02-19 DIAGNOSIS — Z95.5 PRESENCE OF CORONARY ANGIOPLASTY IMPLANT AND GRAFT: Chronic | ICD-10-CM

## 2020-02-19 LAB
ALBUMIN SERPL ELPH-MCNC: 4.4 G/DL — SIGNIFICANT CHANGE UP (ref 3.3–5.2)
ALP SERPL-CCNC: 89 U/L — SIGNIFICANT CHANGE UP (ref 40–120)
ALT FLD-CCNC: 17 U/L — SIGNIFICANT CHANGE UP
ANION GAP SERPL CALC-SCNC: 12 MMOL/L — SIGNIFICANT CHANGE UP (ref 5–17)
AST SERPL-CCNC: 23 U/L — SIGNIFICANT CHANGE UP
BILIRUB DIRECT SERPL-MCNC: 0.1 MG/DL — SIGNIFICANT CHANGE UP (ref 0–0.3)
BILIRUB INDIRECT FLD-MCNC: 0.3 MG/DL — SIGNIFICANT CHANGE UP (ref 0.2–1)
BILIRUB SERPL-MCNC: 0.4 MG/DL — SIGNIFICANT CHANGE UP (ref 0.4–2)
BUN SERPL-MCNC: 17 MG/DL — SIGNIFICANT CHANGE UP (ref 8–20)
CALCIUM SERPL-MCNC: 9.7 MG/DL — SIGNIFICANT CHANGE UP (ref 8.6–10.2)
CHLORIDE SERPL-SCNC: 103 MMOL/L — SIGNIFICANT CHANGE UP (ref 98–107)
CHOLEST SERPL-MCNC: 179 MG/DL — SIGNIFICANT CHANGE UP (ref 110–199)
CO2 SERPL-SCNC: 27 MMOL/L — SIGNIFICANT CHANGE UP (ref 22–29)
CREAT SERPL-MCNC: 0.92 MG/DL — SIGNIFICANT CHANGE UP (ref 0.5–1.3)
GLUCOSE SERPL-MCNC: 101 MG/DL — HIGH (ref 70–99)
HBA1C BLD-MCNC: 5.3 % — SIGNIFICANT CHANGE UP (ref 4–5.6)
HCT VFR BLD CALC: 45.2 % — SIGNIFICANT CHANGE UP (ref 39–50)
HDLC SERPL-MCNC: 58 MG/DL — SIGNIFICANT CHANGE UP
HGB BLD-MCNC: 14.6 G/DL — SIGNIFICANT CHANGE UP (ref 13–17)
LIPID PNL WITH DIRECT LDL SERPL: 92 MG/DL — SIGNIFICANT CHANGE UP
MCHC RBC-ENTMCNC: 29.7 PG — SIGNIFICANT CHANGE UP (ref 27–34)
MCHC RBC-ENTMCNC: 32.3 GM/DL — SIGNIFICANT CHANGE UP (ref 32–36)
MCV RBC AUTO: 91.9 FL — SIGNIFICANT CHANGE UP (ref 80–100)
PLATELET # BLD AUTO: 222 K/UL — SIGNIFICANT CHANGE UP (ref 150–400)
POTASSIUM SERPL-MCNC: 4.7 MMOL/L — SIGNIFICANT CHANGE UP (ref 3.5–5.3)
POTASSIUM SERPL-SCNC: 4.7 MMOL/L — SIGNIFICANT CHANGE UP (ref 3.5–5.3)
PROT SERPL-MCNC: 7.5 G/DL — SIGNIFICANT CHANGE UP (ref 6.6–8.7)
PSA SERPL-MCNC: 0.57 NG/ML — SIGNIFICANT CHANGE UP (ref 0–4)
RBC # BLD: 4.92 M/UL — SIGNIFICANT CHANGE UP (ref 4.2–5.8)
RBC # FLD: 13.4 % — SIGNIFICANT CHANGE UP (ref 10.3–14.5)
SODIUM SERPL-SCNC: 142 MMOL/L — SIGNIFICANT CHANGE UP (ref 135–145)
TOTAL CHOLESTEROL/HDL RATIO MEASUREMENT: 3 RATIO — LOW (ref 3.4–9.6)
TRIGL SERPL-MCNC: 145 MG/DL — SIGNIFICANT CHANGE UP (ref 10–200)
TSH SERPL-MCNC: 2.61 UIU/ML — SIGNIFICANT CHANGE UP (ref 0.27–4.2)
WBC # BLD: 7.22 K/UL — SIGNIFICANT CHANGE UP (ref 3.8–10.5)
WBC # FLD AUTO: 7.22 K/UL — SIGNIFICANT CHANGE UP (ref 3.8–10.5)

## 2020-02-19 PROCEDURE — 83036 HEMOGLOBIN GLYCOSYLATED A1C: CPT

## 2020-02-19 PROCEDURE — 84443 ASSAY THYROID STIM HORMONE: CPT

## 2020-02-19 PROCEDURE — 80053 COMPREHEN METABOLIC PANEL: CPT

## 2020-02-19 PROCEDURE — 80061 LIPID PANEL: CPT

## 2020-02-19 PROCEDURE — 85027 COMPLETE CBC AUTOMATED: CPT

## 2020-02-19 PROCEDURE — 84153 ASSAY OF PSA TOTAL: CPT

## 2020-02-19 PROCEDURE — G0463: CPT

## 2020-02-19 PROCEDURE — 82248 BILIRUBIN DIRECT: CPT

## 2020-02-26 ENCOUNTER — OUTPATIENT (OUTPATIENT)
Dept: OUTPATIENT SERVICES | Facility: HOSPITAL | Age: 60
LOS: 1 days | End: 2020-02-26
Payer: SELF-PAY

## 2020-02-26 ENCOUNTER — APPOINTMENT (OUTPATIENT)
Dept: CARDIOLOGY | Facility: CLINIC | Age: 60
End: 2020-02-26

## 2020-02-26 VITALS
DIASTOLIC BLOOD PRESSURE: 79 MMHG | WEIGHT: 216 LBS | BODY MASS INDEX: 30.24 KG/M2 | HEIGHT: 71 IN | SYSTOLIC BLOOD PRESSURE: 126 MMHG | RESPIRATION RATE: 14 BRPM | HEART RATE: 76 BPM

## 2020-02-26 DIAGNOSIS — I25.10 ATHEROSCLEROTIC HEART DISEASE OF NATIVE CORONARY ARTERY WITHOUT ANGINA PECTORIS: ICD-10-CM

## 2020-02-26 DIAGNOSIS — Z95.5 PRESENCE OF CORONARY ANGIOPLASTY IMPLANT AND GRAFT: Chronic | ICD-10-CM

## 2020-02-26 PROCEDURE — G0463: CPT

## 2020-03-23 ENCOUNTER — FORM ENCOUNTER (OUTPATIENT)
Age: 60
End: 2020-03-23

## 2020-03-24 ENCOUNTER — OUTPATIENT (OUTPATIENT)
Dept: OUTPATIENT SERVICES | Facility: HOSPITAL | Age: 60
LOS: 1 days | End: 2020-03-24
Payer: SELF-PAY

## 2020-03-24 DIAGNOSIS — I25.10 ATHEROSCLEROTIC HEART DISEASE OF NATIVE CORONARY ARTERY WITHOUT ANGINA PECTORIS: ICD-10-CM

## 2020-03-24 DIAGNOSIS — Z95.5 PRESENCE OF CORONARY ANGIOPLASTY IMPLANT AND GRAFT: Chronic | ICD-10-CM

## 2020-03-24 LAB
ALBUMIN SERPL ELPH-MCNC: 4.5 G/DL — SIGNIFICANT CHANGE UP (ref 3.3–5.2)
ALP SERPL-CCNC: 87 U/L — SIGNIFICANT CHANGE UP (ref 40–120)
ALT FLD-CCNC: 19 U/L — SIGNIFICANT CHANGE UP
ANION GAP SERPL CALC-SCNC: 10 MMOL/L — SIGNIFICANT CHANGE UP (ref 5–17)
AST SERPL-CCNC: 27 U/L — SIGNIFICANT CHANGE UP
BILIRUB DIRECT SERPL-MCNC: 0.1 MG/DL — SIGNIFICANT CHANGE UP (ref 0–0.3)
BILIRUB INDIRECT FLD-MCNC: 0.3 MG/DL — SIGNIFICANT CHANGE UP (ref 0.2–1)
BILIRUB SERPL-MCNC: 0.4 MG/DL — SIGNIFICANT CHANGE UP (ref 0.4–2)
BUN SERPL-MCNC: 13 MG/DL — SIGNIFICANT CHANGE UP (ref 8–20)
CALCIUM SERPL-MCNC: 9.7 MG/DL — SIGNIFICANT CHANGE UP (ref 8.6–10.2)
CHLORIDE SERPL-SCNC: 99 MMOL/L — SIGNIFICANT CHANGE UP (ref 98–107)
CHOLEST SERPL-MCNC: 186 MG/DL — SIGNIFICANT CHANGE UP (ref 110–199)
CO2 SERPL-SCNC: 28 MMOL/L — SIGNIFICANT CHANGE UP (ref 22–29)
CREAT SERPL-MCNC: 0.79 MG/DL — SIGNIFICANT CHANGE UP (ref 0.5–1.3)
GLUCOSE SERPL-MCNC: 109 MG/DL — HIGH (ref 70–99)
HCT VFR BLD CALC: 45.5 % — SIGNIFICANT CHANGE UP (ref 39–50)
HDLC SERPL-MCNC: 56 MG/DL — SIGNIFICANT CHANGE UP
HGB BLD-MCNC: 16.6 G/DL — SIGNIFICANT CHANGE UP (ref 13–17)
LIPID PNL WITH DIRECT LDL SERPL: 80 MG/DL — SIGNIFICANT CHANGE UP
MCHC RBC-ENTMCNC: 33.3 PG — SIGNIFICANT CHANGE UP (ref 27–34)
MCHC RBC-ENTMCNC: 36.5 GM/DL — HIGH (ref 32–36)
MCV RBC AUTO: 91.2 FL — SIGNIFICANT CHANGE UP (ref 80–100)
PLATELET # BLD AUTO: 234 K/UL — SIGNIFICANT CHANGE UP (ref 150–400)
POTASSIUM SERPL-MCNC: 3.8 MMOL/L — SIGNIFICANT CHANGE UP (ref 3.5–5.3)
POTASSIUM SERPL-SCNC: 3.8 MMOL/L — SIGNIFICANT CHANGE UP (ref 3.5–5.3)
PROT SERPL-MCNC: 7.5 G/DL — SIGNIFICANT CHANGE UP (ref 6.6–8.7)
RBC # BLD: 4.99 M/UL — SIGNIFICANT CHANGE UP (ref 4.2–5.8)
RBC # FLD: 13.3 % — SIGNIFICANT CHANGE UP (ref 10.3–14.5)
SODIUM SERPL-SCNC: 137 MMOL/L — SIGNIFICANT CHANGE UP (ref 135–145)
TOTAL CHOLESTEROL/HDL RATIO MEASUREMENT: 3 RATIO — LOW (ref 3.4–9.6)
TRIGL SERPL-MCNC: 249 MG/DL — HIGH (ref 10–200)
TSH SERPL-MCNC: 2.92 UIU/ML — SIGNIFICANT CHANGE UP (ref 0.27–4.2)
WBC # BLD: 7.99 K/UL — SIGNIFICANT CHANGE UP (ref 3.8–10.5)
WBC # FLD AUTO: 7.99 K/UL — SIGNIFICANT CHANGE UP (ref 3.8–10.5)

## 2020-03-24 PROCEDURE — G0463: CPT

## 2020-03-24 PROCEDURE — 80053 COMPREHEN METABOLIC PANEL: CPT

## 2020-03-24 PROCEDURE — 84443 ASSAY THYROID STIM HORMONE: CPT

## 2020-03-24 PROCEDURE — 93017 CV STRESS TEST TRACING ONLY: CPT

## 2020-03-24 PROCEDURE — 85027 COMPLETE CBC AUTOMATED: CPT

## 2020-03-24 PROCEDURE — 80061 LIPID PANEL: CPT

## 2020-03-24 PROCEDURE — 78452 HT MUSCLE IMAGE SPECT MULT: CPT

## 2020-03-24 PROCEDURE — 36415 COLL VENOUS BLD VENIPUNCTURE: CPT

## 2020-03-24 PROCEDURE — 93306 TTE W/DOPPLER COMPLETE: CPT

## 2020-03-24 PROCEDURE — A9500: CPT

## 2020-03-24 PROCEDURE — 93306 TTE W/DOPPLER COMPLETE: CPT | Mod: 26

## 2020-03-24 PROCEDURE — 82248 BILIRUBIN DIRECT: CPT

## 2020-12-15 ENCOUNTER — RX RENEWAL (OUTPATIENT)
Age: 60
End: 2020-12-15

## 2021-12-15 NOTE — ED PROVIDER NOTE - ENMT, MLM
[facility]  Progress Note     Linden Diaz Patient Status:  Inpatient    1965 MRN [id number]   [facility] 5NW-A Attending Sanjay Florence MD   Hosp Day # 3 PCP Lou Glez DO     Chief Complaint: sob    S: Patient o2 n • enoxaparin  40 mg Subcutaneous Daily   • dexamethasone  6 mg Oral Daily    Or   • dexamethasone Sodium Phosphate  6 mg Intravenous Daily   • zinc sulfate  220 mg Oral BID   • cholecalciferol  2,000 Units Oral Daily   • ascorbic acid  1,000 mg Oral Ayanna Airway patent, Nasal mucosa clear. Mouth with normal mucosa.

## 2021-12-27 ENCOUNTER — RX RENEWAL (OUTPATIENT)
Age: 61
End: 2021-12-27

## 2021-12-28 ENCOUNTER — RX RENEWAL (OUTPATIENT)
Age: 61
End: 2021-12-28

## 2022-03-07 ENCOUNTER — NON-APPOINTMENT (OUTPATIENT)
Age: 62
End: 2022-03-07

## 2022-03-07 ENCOUNTER — APPOINTMENT (OUTPATIENT)
Dept: CARDIOLOGY | Facility: CLINIC | Age: 62
End: 2022-03-07
Payer: COMMERCIAL

## 2022-03-07 VITALS
HEART RATE: 64 BPM | HEIGHT: 71 IN | RESPIRATION RATE: 16 BRPM | WEIGHT: 234 LBS | BODY MASS INDEX: 32.76 KG/M2 | SYSTOLIC BLOOD PRESSURE: 130 MMHG | DIASTOLIC BLOOD PRESSURE: 82 MMHG

## 2022-03-07 PROCEDURE — 93000 ELECTROCARDIOGRAM COMPLETE: CPT

## 2022-03-07 PROCEDURE — 99214 OFFICE O/P EST MOD 30 MIN: CPT

## 2022-03-07 RX ORDER — SENNOSIDES 8.6 MG TABLETS 8.6 MG/1
TABLET ORAL
Refills: 0 | Status: DISCONTINUED | COMMUNITY
End: 2022-03-07

## 2022-03-07 RX ORDER — FERROUS SULFATE 325(65) MG
325 (65 FE) TABLET ORAL 3 TIMES DAILY
Qty: 270 | Refills: 1 | Status: DISCONTINUED | COMMUNITY
End: 2022-03-07

## 2022-03-07 NOTE — REASON FOR VISIT
[FreeTextEntry1] : Mr. Gill Bansal is a pleasant 61-cierra-old  male with a past medical history significant for hypertension, hyperlipidemia, coronary artery disease status-post multi-stenting, who presents for follow up evaluation.

## 2022-03-07 NOTE — HISTORY OF PRESENT ILLNESS
[FreeTextEntry1] : Mr. Gill Bansal presents today feeling well.  Denies complaints of exertional chest pain, shortness of breath, palpitations, lightheadedness or syncope.  Admits he has not been as compliant with his diet and low sodium intake as he should be.

## 2022-03-07 NOTE — DISCUSSION/SUMMARY
[FreeTextEntry1] : Case and plan discussed with Dr. Melo.\par \par 1 - Hypertension:  blood pressure well controlled on current medications.  Advised to follow low sodium diet and weight loss.\par \par 2 - Coronary artery disease status-post multivessel PCI/stent (2016):  clinically stable at this time.  Denies exertional chest pain, shortness of breath, palpitations, lightheadedness or syncope.  Will schedule for echocardiogram prior to follow up visit and a nuclear stress test in the near future.\par \par 3 - Hyperlipidemia:  no recent blood work.  Continue with rosuvastatin 10mg daily.  Fasting blood work prior to follow up visit.\par \par 4 - Follow up Dr. Melo in 3 months for results.

## 2022-03-07 NOTE — PHYSICAL EXAM
[Well Developed] : well developed [Well Nourished] : well nourished [No Acute Distress] : no acute distress [Normal Conjunctiva] : normal conjunctiva [Normal Venous Pressure] : normal venous pressure [No Carotid Bruit] : no carotid bruit [Normal S1, S2] : normal S1, S2 [No Murmur] : no murmur [No Rub] : no rub [No Gallop] : no gallop [Clear Lung Fields] : clear lung fields [No Respiratory Distress] : no respiratory distress  [Soft] : abdomen soft [Normal Bowel Sounds] : normal bowel sounds [Normal Gait] : normal gait [No Edema] : no edema [No Rash] : no rash [No Skin Lesions] : no skin lesions [Moves all extremities] : moves all extremities [No Focal Deficits] : no focal deficits [Normal Speech] : normal speech [Alert and Oriented] : alert and oriented [Normal memory] : normal memory [Obese] : obese

## 2022-06-08 ENCOUNTER — APPOINTMENT (OUTPATIENT)
Dept: CARDIOLOGY | Facility: CLINIC | Age: 62
End: 2022-06-08
Payer: COMMERCIAL

## 2022-06-08 PROCEDURE — 93306 TTE W/DOPPLER COMPLETE: CPT

## 2022-06-20 ENCOUNTER — APPOINTMENT (OUTPATIENT)
Dept: CARDIOLOGY | Facility: CLINIC | Age: 62
End: 2022-06-20
Payer: COMMERCIAL

## 2022-06-20 VITALS
WEIGHT: 230 LBS | HEART RATE: 67 BPM | HEIGHT: 71 IN | BODY MASS INDEX: 32.2 KG/M2 | SYSTOLIC BLOOD PRESSURE: 140 MMHG | RESPIRATION RATE: 16 BRPM | DIASTOLIC BLOOD PRESSURE: 80 MMHG

## 2022-06-20 PROCEDURE — 99214 OFFICE O/P EST MOD 30 MIN: CPT

## 2022-06-20 PROCEDURE — 93000 ELECTROCARDIOGRAM COMPLETE: CPT

## 2022-06-22 NOTE — ASSESSMENT
[FreeTextEntry1] : 1.  EKG today reveals normal sinus rhythm at 67 bpm.  Qs in leads III and aVF.  Normal intervals.  No evidence of ischemia.  \par \par 2.  Hypertension:  Blood pressure borderline controlled at this time.  Patient is advised on weight loss as well as a stricter low-salt diet. \par \par 3.  Hyperlipidemia:  Patient without recent bloodwork.  Will undergo fasting lipids prior to his next office visit.  \par \par 4.  Coronary artery disease status-post multivessel PCI:  Clinically stable denying chest pain, shortness of breath, or other symptoms.  Advised to walk as much as possible.  Will undergo echocardiography prior to his next office visit.  Nuclear stress testing likely thereafter.   \par

## 2022-06-22 NOTE — HISTORY OF PRESENT ILLNESS
[FreeTextEntry1] : From a cardiac standpoint, Mr. Hurt denies exertional chest pain, shortness of breath, or other cardiac symptoms.  He states he is less than ideally active.

## 2022-06-22 NOTE — REASON FOR VISIT
[FreeTextEntry1] : Mr. Hurt is a pleasant 61-year-old  male with a past medical history significant for hypertension, hyperlipidemia, coronary artery disease status-post multivessel PCI, who presents for follow up evaluation.  \par \par \par

## 2022-08-12 NOTE — BRIEF OPERATIVE NOTE - POST-OP DX
Acute gastric ulcer, unspecified whether gastric ulcer hemorrhage or perforation present  12/24/2018    Active  KERI Menchaca  Yancey's esophagus determined by endoscopy  12/24/2018    Active  KERI Menchaca [Follow-Up] : a follow-up evaluation of

## 2022-08-29 NOTE — DISCHARGE NOTE ADULT - FUNCTIONAL SCREEN CURRENT LEVEL: DRESSING, MLM
no lesions,  no deformities,  no traumatic injuries,  no significant scars are present,  chest wall non-tender,  no masses present, breathing is unlabored without accessory muscle use,normal breath sounds 0 = independent

## 2022-09-16 ENCOUNTER — LABORATORY RESULT (OUTPATIENT)
Age: 62
End: 2022-09-16

## 2022-09-25 ENCOUNTER — RESULT CHARGE (OUTPATIENT)
Age: 62
End: 2022-09-25

## 2022-09-26 ENCOUNTER — APPOINTMENT (OUTPATIENT)
Dept: CARDIOLOGY | Facility: CLINIC | Age: 62
End: 2022-09-26

## 2022-09-26 VITALS
HEART RATE: 68 BPM | WEIGHT: 236 LBS | DIASTOLIC BLOOD PRESSURE: 80 MMHG | SYSTOLIC BLOOD PRESSURE: 130 MMHG | RESPIRATION RATE: 16 BRPM | HEIGHT: 71 IN | BODY MASS INDEX: 33.04 KG/M2

## 2022-09-26 PROCEDURE — 99214 OFFICE O/P EST MOD 30 MIN: CPT

## 2022-09-26 PROCEDURE — 93000 ELECTROCARDIOGRAM COMPLETE: CPT

## 2022-09-28 NOTE — REASON FOR VISIT
[FreeTextEntry1] : Mr. Hurt is a pleasant 62-year-old  male with a past medical history significant for hypertension, hyperlipidemia, coronary artery disease status-post multivessel PCI, who presents for follow up evaluation.  \par \par \par

## 2022-09-28 NOTE — HISTORY OF PRESENT ILLNESS
[FreeTextEntry1] : From a cardiac standpoint, Mr. Hurt feels well denying exertional chest pain, shortness of breath, or other cardiac symptoms.  He recently traveled to [place] where he was less than ideally compliant with a low-fat / low-cholesterol diet.  \par \par

## 2022-09-28 NOTE — ASSESSMENT
[FreeTextEntry1] : 1.  EKG today reveals normal sinus rhythm at 68 bpm.  Normal intervals.  Qs in leads III and aVF.  No ischemic changes.  \par \par 2.  Hypertension:  Blood pressure well controlled at this time on current medications.  A low-salt diet as well as weight loss advised. \par \par 3.  Hyperlipidemia:  Review of recent bloodwork demonstrates a total cholesterol of 161, HDL 45, LDL 73, triglycerides 215.  A strict low-fat / low-cholesterol diet and continuation of current medication is recommended. \par \par 4.  Coronary artery disease status-post multivessel PCI:  Clinically stable at this time but not exercising regularly.  Has not undergone an evaluation in five years.  Will schedule a two-day nuclear stress test in the near future.  Office visit thereafter.   \par

## 2022-11-01 ENCOUNTER — APPOINTMENT (OUTPATIENT)
Dept: CARDIOLOGY | Facility: CLINIC | Age: 62
End: 2022-11-01

## 2022-11-01 PROCEDURE — A9500: CPT

## 2022-11-01 PROCEDURE — 78452 HT MUSCLE IMAGE SPECT MULT: CPT

## 2022-11-01 RX ADMIN — REGADENOSON 5 MG/5ML: 0.08 INJECTION, SOLUTION INTRAVENOUS at 00:00

## 2022-11-02 ENCOUNTER — APPOINTMENT (OUTPATIENT)
Dept: CARDIOLOGY | Facility: CLINIC | Age: 62
End: 2022-11-02

## 2022-11-09 RX ORDER — REGADENOSON 0.08 MG/ML
0.4 INJECTION, SOLUTION INTRAVENOUS
Qty: 4 | Refills: 0 | Status: COMPLETED | OUTPATIENT
Start: 2022-11-01

## 2022-11-29 ENCOUNTER — APPOINTMENT (OUTPATIENT)
Dept: CARDIOLOGY | Facility: CLINIC | Age: 62
End: 2022-11-29

## 2022-11-29 VITALS
SYSTOLIC BLOOD PRESSURE: 128 MMHG | HEART RATE: 74 BPM | HEIGHT: 71 IN | RESPIRATION RATE: 16 BRPM | WEIGHT: 232 LBS | BODY MASS INDEX: 32.48 KG/M2 | DIASTOLIC BLOOD PRESSURE: 78 MMHG

## 2022-11-29 PROCEDURE — 93000 ELECTROCARDIOGRAM COMPLETE: CPT

## 2022-11-29 PROCEDURE — 99214 OFFICE O/P EST MOD 30 MIN: CPT

## 2022-12-01 NOTE — HISTORY OF PRESENT ILLNESS
[FreeTextEntry1] : From a cardiac standpoint, Mr. Hurt continues to do well denying exertional chest pain, shortness of breath, or other cardiac symptoms.  He states he is less than ideally active at this time.  \par

## 2022-12-01 NOTE — REASON FOR VISIT
[FreeTextEntry1] : Mr. Hurt is a pleasant 62-year-old  male with a past medical history significant for hypertension, hyperlipidemia, coronary artery disease status-post multivessel PCI as well as a history of gastritis, who presents for follow up evaluation.  \par \par \par

## 2022-12-01 NOTE — ASSESSMENT
[FreeTextEntry1] : 1.  EKG today reveals sinus rhythm at 74 bpm.  One PVC.  Left anterior hemiblock with left axis deviation.  Poor R-wave progression across precordium.  No acute ischemic changes.  \par \par 2.  Hypertension:   Blood pressure well controlled at this time on current medications.  A low-salt diet and weight loss advised. \par \par 3.  Hyperlipidemia:  Review of recent bloodwork demonstrates a total cholesterol of 161, HDL 45, LDL 73, triglycerides 215.  Patient advised to continue with current medications as well as follow a strict low-fat / low-cholesterol diet.  Will need to follow a low-carbohydrate diet as well.  If triglycerides remain elevated, may require additional therapy. \par \par 4.  Recent nuclear stress test utilizing IV Regadenoson revealed normal myocardial perfusion without evidence of reversible defects to suggest ischemia or fixed defects to suggest an antecedent infarction.  Gated portion of this evaluation revealed normal resting left ventricular systolic function.  Soft tissue attenuation and diaphragmatic artifacts were noted.  Patient reassured and advised on a strict low-fat / low-cholesterol diet as well as regular aerobic exercise.  If clinically stable, follow up office visit 3- months. \par

## 2023-02-16 RX ORDER — KIT FOR THE PREPARATION OF TECHNETIUM TC99M SESTAMIBI 1 MG/5ML
INJECTION, POWDER, LYOPHILIZED, FOR SOLUTION PARENTERAL
Refills: 0 | Status: COMPLETED | OUTPATIENT
Start: 2023-02-16

## 2023-02-16 RX ADMIN — KIT FOR THE PREPARATION OF TECHNETIUM TC99M SESTAMIBI 0: 1 INJECTION, POWDER, LYOPHILIZED, FOR SOLUTION PARENTERAL at 00:00

## 2023-06-28 ENCOUNTER — RX RENEWAL (OUTPATIENT)
Age: 63
End: 2023-06-28

## 2023-11-17 ENCOUNTER — APPOINTMENT (OUTPATIENT)
Dept: CARDIOLOGY | Facility: CLINIC | Age: 63
End: 2023-11-17
Payer: COMMERCIAL

## 2023-11-17 ENCOUNTER — NON-APPOINTMENT (OUTPATIENT)
Age: 63
End: 2023-11-17

## 2023-11-17 VITALS
HEIGHT: 71 IN | WEIGHT: 232 LBS | BODY MASS INDEX: 32.48 KG/M2 | SYSTOLIC BLOOD PRESSURE: 132 MMHG | HEART RATE: 75 BPM | OXYGEN SATURATION: 97 % | DIASTOLIC BLOOD PRESSURE: 90 MMHG | RESPIRATION RATE: 16 BRPM

## 2023-11-17 VITALS — SYSTOLIC BLOOD PRESSURE: 124 MMHG | DIASTOLIC BLOOD PRESSURE: 80 MMHG

## 2023-11-17 PROCEDURE — 99214 OFFICE O/P EST MOD 30 MIN: CPT

## 2023-11-17 PROCEDURE — 93000 ELECTROCARDIOGRAM COMPLETE: CPT

## 2023-11-17 RX ORDER — ASPIRIN 81 MG/1
81 TABLET ORAL DAILY
Qty: 90 | Refills: 1 | Status: DISCONTINUED | COMMUNITY
Start: 2019-01-09 | End: 2023-11-17

## 2023-11-17 RX ORDER — PANTOPRAZOLE 40 MG/1
40 TABLET, DELAYED RELEASE ORAL
Qty: 90 | Refills: 3 | Status: ACTIVE | COMMUNITY
Start: 2020-12-15 | End: 1900-01-01

## 2023-11-17 RX ORDER — ASCORBIC ACID 500 MG
500 TABLET ORAL DAILY
Qty: 90 | Refills: 3 | Status: DISCONTINUED | COMMUNITY
End: 2023-11-17

## 2024-01-02 NOTE — ED ADULT NURSE NOTE - CHIEF COMPLAINT QUOTE
Nurse to nurse given to JONATHAN Cohen.    Patient arrived to ED today with c/o vomiting blood x2 today this morning and chills.

## 2024-03-22 ENCOUNTER — APPOINTMENT (OUTPATIENT)
Dept: CARDIOLOGY | Facility: CLINIC | Age: 64
End: 2024-03-22
Payer: COMMERCIAL

## 2024-03-22 VITALS
HEIGHT: 71 IN | DIASTOLIC BLOOD PRESSURE: 70 MMHG | RESPIRATION RATE: 16 BRPM | HEART RATE: 71 BPM | BODY MASS INDEX: 31.36 KG/M2 | SYSTOLIC BLOOD PRESSURE: 146 MMHG | WEIGHT: 224 LBS

## 2024-03-22 DIAGNOSIS — E78.00 PURE HYPERCHOLESTEROLEMIA, UNSPECIFIED: ICD-10-CM

## 2024-03-22 DIAGNOSIS — I25.10 ATHEROSCLEROTIC HEART DISEASE OF NATIVE CORONARY ARTERY W/OUT ANGINA PECTORIS: ICD-10-CM

## 2024-03-22 DIAGNOSIS — Z86.79 PERSONAL HISTORY OF OTHER DISEASES OF THE CIRCULATORY SYSTEM: ICD-10-CM

## 2024-03-22 PROCEDURE — 99214 OFFICE O/P EST MOD 30 MIN: CPT

## 2024-03-22 PROCEDURE — 93000 ELECTROCARDIOGRAM COMPLETE: CPT

## 2024-03-22 PROCEDURE — G2211 COMPLEX E/M VISIT ADD ON: CPT

## 2024-03-22 RX ORDER — ASPIRIN ENTERIC COATED TABLETS 81 MG 81 MG/1
81 TABLET, DELAYED RELEASE ORAL DAILY
Qty: 90 | Refills: 3 | Status: ACTIVE | COMMUNITY
Start: 2024-03-22 | End: 1900-01-01

## 2024-03-22 RX ORDER — ASPIRIN 325 MG/1
325 TABLET, FILM COATED ORAL
Refills: 0 | Status: DISCONTINUED | COMMUNITY
End: 2024-03-22

## 2024-03-22 RX ORDER — ROSUVASTATIN CALCIUM 20 MG/1
20 TABLET, FILM COATED ORAL DAILY
Qty: 90 | Refills: 3 | Status: ACTIVE | COMMUNITY
Start: 2019-01-09 | End: 1900-01-01

## 2024-03-25 NOTE — ASSESSMENT
[FreeTextEntry1] : 1. EKG today demonstrates normal sinus rhythm at 71 bpm. Qs in leads III and aVF suggesting inferior wall MI. Normal intervals. Non-specific ST-T changes.   2. Hypertension: Blood pressure suboptimally controlled at this time. Patient clearly not following a low-salt diet. I have had a lengthy conversation with him regarding this as well as reducing the amount of coffee he drinks per day. Weight loss was discussed as well. Given the above, Lisinopril will be increased from 2.5 to 5 mg daily.    3. Hyperlipidemia: Review of recent bloodwork reveals a total cholesterol of 197, HDL 43, , triglycerides 137. Given the patient's known history of multivessel PCI, I have recommended not only a stricter low-fat / low-cholesterol diet, but also augmentation of Rosuvastatin from 10 to 20 mg q.h.s. Patient will undergo fasting lipids in approximately two months with an office visit thereafter.   4. Coronary artery disease/multivessel PCI: Clinically stable without chest pain or shortness of breath. Not exercising on a regular basis. Have recommended that the patient begin regular gradual aerobic exercise.

## 2024-03-25 NOTE — HISTORY OF PRESENT ILLNESS
[FreeTextEntry1] : From a cardiac standpoint, Mr. Gill Bansal denies exertional chest pain, shortness of breath, or other cardiac symptoms. He states that he is less than ideally active and has not been fully compliant with low-fat / low-cholesterol diet. He also states he "loves salt" and has not been compliant with a low-salt intake.

## 2024-03-25 NOTE — REASON FOR VISIT
[FreeTextEntry1] : Mr. Hurt is a pleasant 63-year-old  male with a past medical history significant for hypertension, hyperlipidemia, coronary artery disease status-post multivessel PCI as well as a history of gastritis, who presents for follow up evaluation.